# Patient Record
Sex: MALE | Race: WHITE | NOT HISPANIC OR LATINO | Employment: STUDENT | URBAN - METROPOLITAN AREA
[De-identification: names, ages, dates, MRNs, and addresses within clinical notes are randomized per-mention and may not be internally consistent; named-entity substitution may affect disease eponyms.]

---
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
## 2024-01-17 ENCOUNTER — LAB (OUTPATIENT)
Dept: LAB | Facility: HOSPITAL | Age: 19
End: 2024-01-17
Payer: OTHER GOVERNMENT

## 2024-01-17 ENCOUNTER — OFFICE VISIT (OUTPATIENT)
Dept: PSYCHIATRY | Facility: CLINIC | Age: 19
End: 2024-01-17
Payer: OTHER GOVERNMENT

## 2024-01-17 VITALS
BODY MASS INDEX: 23.73 KG/M2 | HEIGHT: 69 IN | DIASTOLIC BLOOD PRESSURE: 70 MMHG | SYSTOLIC BLOOD PRESSURE: 131 MMHG | HEART RATE: 96 BPM | WEIGHT: 160.2 LBS

## 2024-01-17 DIAGNOSIS — F90.2 ATTENTION DEFICIT HYPERACTIVITY DISORDER (ADHD), COMBINED TYPE: ICD-10-CM

## 2024-01-17 DIAGNOSIS — F84.0 AUTISM DISORDER: Primary | ICD-10-CM

## 2024-01-17 LAB
AMPHET+METHAMPHET UR QL: NEGATIVE
BARBITURATES UR QL SCN: NEGATIVE
BENZODIAZ UR QL SCN: NEGATIVE
CANNABINOIDS SERPL QL: NEGATIVE
COCAINE UR QL: NEGATIVE
FENTANYL UR-MCNC: NEGATIVE NG/ML
METHADONE UR QL SCN: NEGATIVE
OPIATES UR QL: NEGATIVE
OXYCODONE UR QL SCN: NEGATIVE

## 2024-01-17 PROCEDURE — 80307 DRUG TEST PRSMV CHEM ANLYZR: CPT

## 2024-01-17 NOTE — PROGRESS NOTES
"[facility]  Initial Evaluation    Referring Provider:  Dagoberto Robertson, APRN  [address]  [address]  [place] [address]    Chief Complaint: \"I think I have ADHD\"     History of Present Illness: Aneudy Gallegos is a whip-smart 18 y.o. male who presents today for initial evaluation regarding his desire to be screened for ADHD.  Mr. Gallegos presents unaccompanied in no acute distress and engages with me appropriately, he has strategically situated himself behind my double computer screens to avoid eye contact.  Psychotropic regimen with which patient presents is described as nothing.     History is positive for signs/symptoms suggestive of ADHD, and ASD. With regard to ADHD: I am presumptively treating patient for this issue; history as provided today, presentation today, his co-morbid diagnosis of Autism Spectrum Disorder would suggest a distinct possibility this is a contributing factor to patient's dysfunction in  roles and engagements irrespective to screening results. Patient has learned and successfully implemented compensatory coping skills that, with the Orthodoxy of layered stressors throughout adulthood, have begun to fail. Treating ADHD symptoms will likely be a concise and appropriate route to address anxiety and mood issues that are, at least to some degree, secondary to deficits related to traits of ADHD. Patient given options of slower-onset medications with low risk, vs. Stimulant-amphetamine based medications that can cause addiction, and tolerance, and currently on national shortage.   Psychiatric screening is negative for pathognomonic history of: TBI, depression, anxiety, PTSD, madison, psychosis, violence, and suicidality.     I have counseled the patient with regard to diagnoses and the recommended treatment regimen as documented below: I will assume prescriptive responsibility for anticipated ADHD agent. Patient acknowledges the diagnoses per my rendered " interpretation. Patient demonstrates awareness/understanding of viable alternatives for treatment as well as potential risks, benefits, and side effects associated with this regimen and is amenable to proceed in this fashion.     Recommended lifestyle changes: increase socializing, brisk 30 minute walks 3 days a week, consider phone application to maximize executive functioning.     Psychiatric History:  Diagnoses: Autism Spectrum Disorder  Outpatient history: in the home as child with ASD  Inpatient history: none  Medication trials: none  Other treatment modalities: none  Presenting regimen:none  Self harm: none  Suicide attempts: none    Substance Abuse History:   Types/methods/frequency: none  Withdrawal history: none  Sobriety: none  Transtheoretical stage: none    Social History:  Residence: lives house with mom, boyfriend, and nephew  Vocation: car place at [employer]  Education: 12 th grade, some trade school  Pertinent developmental history: ASD  Pertinent legal history: none  Hobbies/interests: radha,   Caodaism: none  Exercise: none  Dietary habits: no pertinent issues  Sleep hygiene: stays up late  Social habits: meeting people at new job  Sunlight: underexposed  Caffeine intake: varies  Hydration habits: not enough   history: family hx    Social History     Socioeconomic History    Marital status: Single   Tobacco Use    Smoking status: Never    Smokeless tobacco: Never   Vaping Use    Vaping Use: Never used   Substance and Sexual Activity    Alcohol use: Never    Drug use: Never    Sexual activity: Defer     Access to Firearms: none    Tobacco use counseling/intervention: N/A, patient does not use tobacco  PHQ-9 Depression Screening  PHQ-9 Total Score: 9    Little interest or pleasure in doing things? 1-->several days   Feeling down, depressed, or hopeless? 1-->several days   Trouble falling or staying asleep, or sleeping too much? 3-->nearly every day   Feeling tired or having little energy?  1-->several days   Poor appetite or overeating? 0-->not at all   Feeling bad about yourself - or that you are a failure or have let yourself or your family down? 1-->several days   Trouble concentrating on things, such as reading the newspaper or watching television? 2-->more than half the days   Moving or speaking so slowly that other people could have noticed? Or the opposite - being so fidgety or restless that you have been moving around a lot more than usual? 0-->not at all   Thoughts that you would be better off dead, or of hurting yourself in some way? 0-->not at all   PHQ-9 Total Score 9     SARAH-7  Feeling nervous, anxious or on edge: More than half the days  Not being able to stop or control worrying: Several days  Worrying too much about different things: More than half the days  Trouble Relaxing: Several days  Being so restless that it is hard to sit still: Several days  Feeling afraid as if something awful might happen: More than half the days  Becoming easily annoyed or irritable: Not at all  SARAH 7 Total Score: 9  If you checked any problems, how difficult have these problems made it for you to do your work, take care of things at home, or get along with other people: Somewhat difficult    Problem List:  Patient Active Problem List   Diagnosis    Autism disorder    Attention deficit hyperactivity disorder (ADHD), combined type     Allergy:   No Known Allergies     Discontinued Medications:  There are no discontinued medications.    Current Medications:   Current Outpatient Medications   Medication Sig Dispense Refill    diphenhydrAMINE (BENADRYL) 25 mg capsule Take 1 capsule by mouth Every 6 (Six) Hours As Needed for Itching.       No current facility-administered medications for this visit.     Past Medical History:  Past Medical History:   Diagnosis Date    Allergic      Past Surgical History:  No past surgical history on file.  Family History:   Family History   Problem Relation Age of Onset    Cancer  "Father      negative for dementia, seizures, bipolar disorder, and substance dependence unless otherwise noted    Mental Status Exam:   Appearance: well-groomed, sits upright, age-appropriate, normal habitus  Behavior: calm, cooperative, appropriate in demeanor, appropriate eye-contact for history   Mood/affect: euthymic / mood-congruent and appropriate in both range and amplitude  Speech: within expected variance; appropriate rate, appropriate rhythm, appropriate tone; non-pressured  Thought Process: linear, goal-directed; no FOI or SHELBIE; abstraction intact  Thought Content: coherent, devoid of overt delusions/perceptual disturbances  SI/HI: denies both SI and HI; exhibits future-orientation, self-advocates appropriately, no regular self-harm, no appreciable intent  Memory: no overt deficits  Orientation: oriented to person/place/time/situation  Concentration: appropriate during interview  Intellectual capacity: presumptively average  Insight: fair by given history/exam  Judgment: appropriate by given history/exam  Psychomotor: no appreciable latency/retardation/agitation/tremor  Gait: WNL    Review of Systems:  Review of Systems     Vital Signs:   /70   Pulse 96   Ht 174.6 cm (68.74\")   Wt 72.7 kg (160 lb 3.2 oz)   BMI 23.84 kg/m²    Lab Results:   Office Visit on 12/20/2023   Component Date Value Ref Range Status    Glucose 12/20/2023 90  65 - 99 mg/dL Final    BUN 12/20/2023 8  6 - 20 mg/dL Final    Creatinine 12/20/2023 0.87  0.76 - 1.27 mg/dL Final    Sodium 12/20/2023 139  136 - 145 mmol/L Final    Potassium 12/20/2023 4.4  3.5 - 5.2 mmol/L Final    Chloride 12/20/2023 102  98 - 107 mmol/L Final    CO2 12/20/2023 26.1  22.0 - 29.0 mmol/L Final    Calcium 12/20/2023 10.1  8.6 - 10.5 mg/dL Final    Total Protein 12/20/2023 8.1  6.0 - 8.5 g/dL Final    Albumin 12/20/2023 4.8  3.5 - 5.2 g/dL Final    ALT (SGPT) 12/20/2023 56 (H)  1 - 41 U/L Final    AST (SGOT) 12/20/2023 45 (H)  1 - 40 U/L Final    " Alkaline Phosphatase 12/20/2023 98  39 - 117 U/L Final    Total Bilirubin 12/20/2023 0.5  0.0 - 1.2 mg/dL Final    Globulin 12/20/2023 3.3  gm/dL Final    A/G Ratio 12/20/2023 1.5  g/dL Final    BUN/Creatinine Ratio 12/20/2023 9.2  7.0 - 25.0 Final    Anion Gap 12/20/2023 10.9  5.0 - 15.0 mmol/L Final    eGFR 12/20/2023 127.5  >60.0 mL/min/1.73 Final    TSH 12/20/2023 2.890  0.270 - 4.200 uIU/mL Final    Total Cholesterol 12/20/2023 194  0 - 200 mg/dL Final    Triglycerides 12/20/2023 130  0 - 150 mg/dL Final    HDL Cholesterol 12/20/2023 53  40 - 60 mg/dL Final    LDL Cholesterol  12/20/2023 118 (H)  0 - 100 mg/dL Final    VLDL Cholesterol 12/20/2023 23  5 - 40 mg/dL Final    LDL/HDL Ratio 12/20/2023 2.17   Final    WBC 12/20/2023 7.24  3.40 - 10.80 10*3/mm3 Final    RBC 12/20/2023 5.83 (H)  4.14 - 5.80 10*6/mm3 Final    Hemoglobin 12/20/2023 16.1  13.0 - 17.7 g/dL Final    Hematocrit 12/20/2023 49.3  37.5 - 51.0 % Final    MCV 12/20/2023 84.6  79.0 - 97.0 fL Final    MCH 12/20/2023 27.6  26.6 - 33.0 pg Final    MCHC 12/20/2023 32.7  31.5 - 35.7 g/dL Final    RDW 12/20/2023 13.1  12.3 - 15.4 % Final    RDW-SD 12/20/2023 40.0  37.0 - 54.0 fl Final    MPV 12/20/2023 11.3  6.0 - 12.0 fL Final    Platelets 12/20/2023 301  140 - 450 10*3/mm3 Final    Neutrophil % 12/20/2023 44.9  42.7 - 76.0 % Final    Lymphocyte % 12/20/2023 36.0  19.6 - 45.3 % Final    Monocyte % 12/20/2023 6.4  5.0 - 12.0 % Final    Eosinophil % 12/20/2023 11.3 (H)  0.3 - 6.2 % Final    Basophil % 12/20/2023 1.0  0.0 - 1.5 % Final    Immature Grans % 12/20/2023 0.4  0.0 - 0.5 % Final    Neutrophils, Absolute 12/20/2023 3.25  1.70 - 7.00 10*3/mm3 Final    Lymphocytes, Absolute 12/20/2023 2.61  0.70 - 3.10 10*3/mm3 Final    Monocytes, Absolute 12/20/2023 0.46  0.10 - 0.90 10*3/mm3 Final    Eosinophils, Absolute 12/20/2023 0.82 (H)  0.00 - 0.40 10*3/mm3 Final    Basophils, Absolute 12/20/2023 0.07  0.00 - 0.20 10*3/mm3 Final    Immature Grans, Absolute  12/20/2023 0.03  0.00 - 0.05 10*3/mm3 Final    nRBC 12/20/2023 0.0  0.0 - 0.2 /100 WBC Final     EKG Results:  No orders to display     Imaging Results:  No Images in the past 120 days found..  ASSESSMENT AND PLAN:    ICD-10-CM ICD-9-CM   1. Autism disorder  F84.0 299.00   2. Attention deficit hyperactivity disorder (ADHD), combined type  F90.2 314.01       18 y.o. male who presents today for initial evaluation regarding ADHD medication and screening.. We have discussed the history and interpreted diagnoses as above as well as the treatment plan below, including potential R/B/SE of the recommended regimen of which the patient demonstrates understanding. Patient is agreeable to call 911 or go to the nearest ER should he become concerned for he own safety and/or the safety of those around him. There are no overt indices of acute madison/psychosis on evaluation today.     Medication regimen: anticipated ADHD medication after UDS results, patient is advised not to misuse prescribed medications or to use any exogenous substances that aren't disclosed to this provider as they may interact with the regimen to his detriment.   Risk Assessment: protracted risk is low, imminent risk is low.  Risk factors include: recent/ongoing psychosocial stressors. Protective factors include: no known family history of suicidality, intact reality testing, no substance use disorder, no access to firearms, no present SI, no stated history of suicide attempts or self-harm, patient's exhibited future-orientation, strong social support, and patient's cooperation with care. Do note that this is subject to change with the Druze of new stressors, treatment non-adherence, use of substances, and/or new medical ails.  Monitoring: reviewed labs/imaging as populated above, UDS ordered; PHQ-9 today is 9/27, SARAH-7 today is 9/21  Therapy: defer  Follow-up: 2 weeks  Communications: N/A    TREATMENT PLAN/GOALS: challenge patterns of living conducive to  symptom burden, implement recommended regimen as above with augmentative, intermittent supportive psychotherapy to reduce symptom burden. Patient acknowledged and verbally consented to begin treatment as above. The importance of adherence to the recommended treatment and interval follow-up appointments was emphasized today. Patient was today advised to limit daily caffeine intake, hydrate appropriately, eat healthy and nutritious foods, engage sleep hygiene measures, engage appropriate exposure to sunlight, engage with hobbies in balance with life necessities, and exercise appropriate to their capacity to do so.     Billing: I have seen the patient today and considered his psychiatric complaints, rendered a diagnosis, and discussed treatment with the patient as above with which he consents.    Parts of this note are electronic transcriptions/translations of spoken language to printed text using the Dragon Dictation system.    Electronically signed by LAURA Duenas, 01/17/24,

## 2024-01-18 DIAGNOSIS — F90.2 ATTENTION DEFICIT HYPERACTIVITY DISORDER (ADHD), COMBINED TYPE: Primary | ICD-10-CM

## 2024-01-18 RX ORDER — DEXTROAMPHETAMINE SACCHARATE, AMPHETAMINE ASPARTATE MONOHYDRATE, DEXTROAMPHETAMINE SULFATE AND AMPHETAMINE SULFATE 1.25; 1.25; 1.25; 1.25 MG/1; MG/1; MG/1; MG/1
5 CAPSULE, EXTENDED RELEASE ORAL EVERY MORNING
Qty: 30 CAPSULE | Refills: 0 | Status: SHIPPED | OUTPATIENT
Start: 2024-01-18 | End: 2024-02-17

## 2024-01-31 ENCOUNTER — OFFICE VISIT (OUTPATIENT)
Dept: PSYCHIATRY | Facility: CLINIC | Age: 19
End: 2024-01-31
Payer: OTHER GOVERNMENT

## 2024-01-31 VITALS
DIASTOLIC BLOOD PRESSURE: 75 MMHG | BODY MASS INDEX: 25.34 KG/M2 | SYSTOLIC BLOOD PRESSURE: 128 MMHG | HEIGHT: 68 IN | HEART RATE: 86 BPM | WEIGHT: 167.2 LBS

## 2024-01-31 DIAGNOSIS — F90.2 ATTENTION DEFICIT HYPERACTIVITY DISORDER (ADHD), COMBINED TYPE: Primary | ICD-10-CM

## 2024-01-31 DIAGNOSIS — F84.0 AUTISM DISORDER: ICD-10-CM

## 2024-01-31 NOTE — PROGRESS NOTES
"[facility]  Follow-up Visit    Chief Complaint: \"\"I think I have ADHD\"      History of Present Illness: Aneudy Gallegos is a 18 y.o. male who presents today for follow-up regarding referral for ADHD neuropsychological testing. Last seen: 1/17/2024 at which time he was screened for ADHD, presumptively diagnosed for ADHD.  Mr. Gallegos presents unaccompanied in no acute distress and engages with me appropriately. Psychotropic regimen perceived to be Adderall XR 5 mg by mouth daily in a.m. Side-effects per given history: Patient reports that he has not started medication yet due to delay in pharmacy notification.    Current treatment regimen includes:   Amphetamine/dextroamphetamine XR 5 mg by mouth every morning for ADHD    Today the patient feels okay. Thought process and content are devoid of overt aberration suggestive of acute madison/psychosis. The patient denies SI/HI/AVH. There are no overt changes on exam today compared to most recent evaluation.  - contextual changes: No changes  - sleep: No changes  - appetite: No changes    I have counseled the patient with regard to diagnoses and the recommended treatment regimen as documented below. Patient acknowledges the diagnoses per my rendered interpretation. Patient demonstrates understanding of potential risks/benefits/side effects associated with this regimen and is amenable to proceed in this fashion.     Assignment: Talk to parents about  coverage and neuropsychological testing for official ADHD diagnosis.  Psychiatric History:  - Pertinent interval changes: No changes  - Psychotropic medication trials: No medication  - Key synopsis: Amphetamine dextroamphetamine XR 5 mg p.o. daily in a.m.    Substance Abuse History:   - Pertinent interval changes: No history  - Key synopsis: No history    Social History:  - Pertinent interval changes: No changes  - Key synopsis: No changes    Social History     Socioeconomic History "    Marital status: Single   Tobacco Use    Smoking status: Never    Smokeless tobacco: Never   Vaping Use    Vaping Use: Never used   Substance and Sexual Activity    Alcohol use: Never    Drug use: Never    Sexual activity: Defer       Tobacco use counseling/intervention: N/A, patient does not use tobacco    Problem List:  Patient Active Problem List   Diagnosis    Autism disorder    Attention deficit hyperactivity disorder (ADHD), combined type     Allergy:   No Known Allergies     Discontinued Medications:  There are no discontinued medications.    Current Medications:   Current Outpatient Medications   Medication Sig Dispense Refill    amphetamine-dextroamphetamine XR (Adderall XR) 5 MG 24 hr capsule Take 1 capsule by mouth Every Morning for 30 days 30 capsule 0    diphenhydrAMINE (BENADRYL) 25 mg capsule Take 1 capsule by mouth Every 6 (Six) Hours As Needed for Itching. (Patient not taking: Reported on 1/31/2024)       No current facility-administered medications for this visit.     Past Medical History:  Past Medical History:   Diagnosis Date    Allergic      Past Surgical History:  No past surgical history on file.    Mental Status Exam:   Appearance: well-groomed, sits upright, age-appropriate, normal habitus  Behavior: calm, cooperative, appropriate in demeanor, appropriate eye-contact  Mood/affect: euthymic / mood-congruent and appropriate in both range and amplitude  Speech: within expected variance; appropriate rate, appropriate rhythm, appropriate tone; non-pressured  Thought Process: linear, goal-directed; no FOI or SHELBIE; abstraction intact  Thought Content: coherent, devoid of overt delusions/perceptual disturbances  SI/HI: denies both SI and HI; exhibits future-orientation, self-advocates appropriately, no regular self-harm, no appreciable intent  Memory: no overt deficits  Orientation: oriented to person/place/time/situation  Concentration: appropriate during interview  Intellectual capacity:  "presumptively average  Insight: fair by given history/exam  Judgment: appropriate by given history/exam  Psychomotor: no appreciable latency/retardation/agitation/tremor  Gait: WNL    Review of Systems:  Review of Systems   Constitutional:  Negative for activity change, appetite change and unexpected weight change.   HENT:  Negative for drooling.    Eyes:  Negative for visual disturbance.   Respiratory:  Negative for chest tightness and shortness of breath.    Cardiovascular:  Negative for chest pain and palpitations.   Gastrointestinal:  Negative for abdominal pain, diarrhea and nausea.   Endocrine: Negative for cold intolerance and heat intolerance.   Genitourinary:  Negative for difficulty urinating and frequency.   Musculoskeletal:  Negative for myalgias and neck stiffness.   Skin:  Negative for rash.   Neurological:  Negative for dizziness, tremors, seizures and light-headedness.     Vital Signs:   /75   Pulse 86   Ht 172.7 cm (68\")   Wt 75.8 kg (167 lb 3.2 oz)   BMI 25.42 kg/m²    Lab Results:   Lab on 01/17/2024   Component Date Value Ref Range Status    Amphet/Methamphet, Screen 01/17/2024 Negative  Negative Final    Barbiturates Screen, Urine 01/17/2024 Negative  Negative Final    Benzodiazepine Screen, Urine 01/17/2024 Negative  Negative Final    Cocaine Screen, Urine 01/17/2024 Negative  Negative Final    Opiate Screen 01/17/2024 Negative  Negative Final    THC, Screen, Urine 01/17/2024 Negative  Negative Final    Methadone Screen, Urine 01/17/2024 Negative  Negative Final    Oxycodone Screen, Urine 01/17/2024 Negative  Negative Final    Fentanyl, Urine 01/17/2024 Negative  Negative Final   Office Visit on 12/20/2023   Component Date Value Ref Range Status    Glucose 12/20/2023 90  65 - 99 mg/dL Final    BUN 12/20/2023 8  6 - 20 mg/dL Final    Creatinine 12/20/2023 0.87  0.76 - 1.27 mg/dL Final    Sodium 12/20/2023 139  136 - 145 mmol/L Final    Potassium 12/20/2023 4.4  3.5 - 5.2 mmol/L Final "    Chloride 12/20/2023 102  98 - 107 mmol/L Final    CO2 12/20/2023 26.1  22.0 - 29.0 mmol/L Final    Calcium 12/20/2023 10.1  8.6 - 10.5 mg/dL Final    Total Protein 12/20/2023 8.1  6.0 - 8.5 g/dL Final    Albumin 12/20/2023 4.8  3.5 - 5.2 g/dL Final    ALT (SGPT) 12/20/2023 56 (H)  1 - 41 U/L Final    AST (SGOT) 12/20/2023 45 (H)  1 - 40 U/L Final    Alkaline Phosphatase 12/20/2023 98  39 - 117 U/L Final    Total Bilirubin 12/20/2023 0.5  0.0 - 1.2 mg/dL Final    Globulin 12/20/2023 3.3  gm/dL Final    A/G Ratio 12/20/2023 1.5  g/dL Final    BUN/Creatinine Ratio 12/20/2023 9.2  7.0 - 25.0 Final    Anion Gap 12/20/2023 10.9  5.0 - 15.0 mmol/L Final    eGFR 12/20/2023 127.5  >60.0 mL/min/1.73 Final    TSH 12/20/2023 2.890  0.270 - 4.200 uIU/mL Final    Total Cholesterol 12/20/2023 194  0 - 200 mg/dL Final    Triglycerides 12/20/2023 130  0 - 150 mg/dL Final    HDL Cholesterol 12/20/2023 53  40 - 60 mg/dL Final    LDL Cholesterol  12/20/2023 118 (H)  0 - 100 mg/dL Final    VLDL Cholesterol 12/20/2023 23  5 - 40 mg/dL Final    LDL/HDL Ratio 12/20/2023 2.17   Final    WBC 12/20/2023 7.24  3.40 - 10.80 10*3/mm3 Final    RBC 12/20/2023 5.83 (H)  4.14 - 5.80 10*6/mm3 Final    Hemoglobin 12/20/2023 16.1  13.0 - 17.7 g/dL Final    Hematocrit 12/20/2023 49.3  37.5 - 51.0 % Final    MCV 12/20/2023 84.6  79.0 - 97.0 fL Final    MCH 12/20/2023 27.6  26.6 - 33.0 pg Final    MCHC 12/20/2023 32.7  31.5 - 35.7 g/dL Final    RDW 12/20/2023 13.1  12.3 - 15.4 % Final    RDW-SD 12/20/2023 40.0  37.0 - 54.0 fl Final    MPV 12/20/2023 11.3  6.0 - 12.0 fL Final    Platelets 12/20/2023 301  140 - 450 10*3/mm3 Final    Neutrophil % 12/20/2023 44.9  42.7 - 76.0 % Final    Lymphocyte % 12/20/2023 36.0  19.6 - 45.3 % Final    Monocyte % 12/20/2023 6.4  5.0 - 12.0 % Final    Eosinophil % 12/20/2023 11.3 (H)  0.3 - 6.2 % Final    Basophil % 12/20/2023 1.0  0.0 - 1.5 % Final    Immature Grans % 12/20/2023 0.4  0.0 - 0.5 % Final    Neutrophils,  Absolute 12/20/2023 3.25  1.70 - 7.00 10*3/mm3 Final    Lymphocytes, Absolute 12/20/2023 2.61  0.70 - 3.10 10*3/mm3 Final    Monocytes, Absolute 12/20/2023 0.46  0.10 - 0.90 10*3/mm3 Final    Eosinophils, Absolute 12/20/2023 0.82 (H)  0.00 - 0.40 10*3/mm3 Final    Basophils, Absolute 12/20/2023 0.07  0.00 - 0.20 10*3/mm3 Final    Immature Grans, Absolute 12/20/2023 0.03  0.00 - 0.05 10*3/mm3 Final    nRBC 12/20/2023 0.0  0.0 - 0.2 /100 WBC Final     EKG Results:  No orders to display     Imaging Results:  No Images in the past 120 days found..  ASSESSMENT AND PLAN:    ICD-10-CM ICD-9-CM   1. Attention deficit hyperactivity disorder (ADHD), combined type  F90.2 314.01   2. Autism disorder  F84.0 299.00       18 y.o. male who presents today for follow-up regarding medication management and referral for neuropsychological testing. We have discussed the interval history and the treatment plan below, including potential R/B/SE of the recommended regimen of which the patient demonstrates understanding. Patient is agreeable to call 911 or go to the nearest ER should he become concerned for his own safety and/or the safety of those around him. There are no overt indices of acute madison/psychosis on exam today. JOBY reviewed and isas expected.    Medication regimen: Amphetamine/dextroamphetamine XR 5 mg p.o. daily a.m.; patient is advised not to misuse prescribed medications or to use them with any exogenous substances that aren't disclosed to this provider as they may interact with the regimen to the patient's detriment.   Risk Assessment: protracted risk is low, imminent risk is low-no interval change. Do note that this is subject to change with the Mu-ism of new stressors, treatment non-adherence, use of substances, and/or new medical ails.   Monitoring: reviewed labs/imaging as populated above;HORACIO reviewed  Therapy:[facility]    Follow-up: 2 weeks  Communications: N/A    TREATMENT  PLAN/GOALS: challenge patterns of living conducive to symptom burden, implement recommended regimen as above with augmentative, intermittent supportive psychotherapy to reduce symptom burden. Patient acknowledged and verbally consented to continue treatment. The importance of adherence to the recommended treatment and interval follow-up appointments was again emphasized today: patient has fair treatment adherence per given history. Patient was today reminded to limit daily caffeine intake, hydrate appropriately, eat healthy and nutritious foods, engage sleep hygiene measures, engage appropriate exposure to sunlight, engage with hobbies in balance with life necessities, and exercise appropriate to their capacity to do so.     Parts of this note are electronic transcriptions/translations of spoken language to printed text using the Dragon Dictation system.    Electronically signed by LAURA Duenas, 01/31/24,

## 2024-02-14 ENCOUNTER — OFFICE VISIT (OUTPATIENT)
Dept: PSYCHIATRY | Facility: CLINIC | Age: 19
End: 2024-02-14
Payer: OTHER GOVERNMENT

## 2024-02-14 VITALS
SYSTOLIC BLOOD PRESSURE: 124 MMHG | WEIGHT: 169.2 LBS | HEART RATE: 80 BPM | HEIGHT: 68 IN | BODY MASS INDEX: 25.64 KG/M2 | DIASTOLIC BLOOD PRESSURE: 81 MMHG

## 2024-02-14 DIAGNOSIS — F84.0 AUTISM DISORDER: ICD-10-CM

## 2024-02-14 DIAGNOSIS — F90.2 ATTENTION DEFICIT HYPERACTIVITY DISORDER (ADHD), COMBINED TYPE: Primary | ICD-10-CM

## 2024-03-21 ENCOUNTER — TELEPHONE (OUTPATIENT)
Dept: PSYCHIATRY | Facility: CLINIC | Age: 19
End: 2024-03-21
Payer: OTHER GOVERNMENT

## 2024-03-21 DIAGNOSIS — F90.2 ATTENTION DEFICIT HYPERACTIVITY DISORDER (ADHD), COMBINED TYPE: Primary | ICD-10-CM

## 2024-03-21 NOTE — TELEPHONE ENCOUNTER
PT(PATIENT) VERIFIED     PT(PATIENT) STATES HE WAS SUPPOSED TO START ADDEREAL 10MG     PER MOST RECENT OFFICE NOTE   Office Visit with Flower Robertson APRN (2024)   Medications:   TBD 2024 patient is due refill of Adderall XR we will increase to 10 mg daily.    PT(PATIENT) COMPLETED UDS(URINE DRUG SCREEN)   Urine Drug Screen - Urine, Clean Catch (2024 16:51)     NEXT APPT WITH PROVIDER   Appointment with Flower Robertson APRN (2024)     PROVIDER PLEASE ADVISE

## 2024-03-22 RX ORDER — DEXTROAMPHETAMINE SACCHARATE, AMPHETAMINE ASPARTATE MONOHYDRATE, DEXTROAMPHETAMINE SULFATE AND AMPHETAMINE SULFATE 2.5; 2.5; 2.5; 2.5 MG/1; MG/1; MG/1; MG/1
10 CAPSULE, EXTENDED RELEASE ORAL DAILY
Qty: 30 CAPSULE | Refills: 0 | Status: SHIPPED | OUTPATIENT
Start: 2024-03-22 | End: 2024-04-21

## 2024-04-16 NOTE — PROGRESS NOTES
"[facility]  Follow-up Visit    Chief Complaint:  \"I think I have ADHD\"      Initial History: Aneudy Gallegos is a whip-smart 18 y.o. male who presents today for initial evaluation regarding his desire to be screened for ADHD.  Mr. Gallegos presents unaccompanied in no acute distress and engages with me appropriately, he has strategically situated himself behind my double computer screens to avoid eye contact.  Psychotropic regimen with which patient presents is described as nothing.      History is positive for signs/symptoms suggestive of ADHD, and ASD. With regard to ADHD: I am presumptively treating patient for this issue; history as provided today, presentation today, his co-morbid diagnosis of Autism Spectrum Disorder would suggest a distinct possibility this is a contributing factor to patient's dysfunction in  roles and engagements irrespective to screening results. Patient has learned and successfully implemented compensatory coping skills that, with the Bahai of layered stressors throughout adulthood, have begun to fail. Treating ADHD symptoms will likely be a concise and appropriate route to address anxiety and mood issues that are, at least to some degree, secondary to deficits related to traits of ADHD. Patient given options of slower-onset medications with low risk, vs. Stimulant-amphetamine based medications that can cause addiction, and tolerance, and currently on national shortage.   Psychiatric screening is negative for pathognomonic history of: TBI, depression, anxiety, PTSD, madison, psychosis, violence, and suicidality.      I have counseled the patient with regard to diagnoses and the recommended treatment regimen as documented below: I will assume prescriptive responsibility for anticipated ADHD agent. Patient acknowledges the diagnoses per my rendered interpretation. Patient demonstrates awareness/understanding of viable alternatives for treatment as well " as potential risks, benefits, and side effects associated with this regimen and is amenable to proceed in this fashion.      Recommended lifestyle changes: increase socializing, brisk 30 minute walks 3 days a week, consider phone application to maximize executive functioning.      Psychiatric History:  Diagnoses: Autism Spectrum Disorder  Outpatient history: in the home as child with ASD  Inpatient history: none  Medication trials: none  Other treatment modalities: none  Presenting regimen:none  Self harm: none  Suicide attempts: none     Substance Abuse History:   Types/methods/frequency: none  Withdrawal history: none  Sobriety: none  Transtheoretical stage: none     Social History:  Residence: lives house with mom, boyfriend, and nephew  Vocation: car place at [employer]  Education: 12 th grade, some trade school  Pertinent developmental history: ASD  Pertinent legal history: none  Hobbies/interests: radha,   Scientologist: none  Exercise: none  Dietary habits: no pertinent issues  Sleep hygiene: stays up late  Social habits: meeting people at new job  Sunlight: underexposed  Caffeine intake: varies  Hydration habits: not enough   history: family hx            Interval History Aneudy is a 18 y.o. male who presents today for follow-up regarding medication management. Aneudy presents unaccompanied in no acute distress and engages with me appropriately.     Current treatment regimen includes:   -Amphetamine/dextroamphetamine XR 10 mg p.o. every morning  Side-effects per given history: Patient reports this dose works great.      Today the patient feels frustrated.  Mr. Gallegos expressed some concerns over me receiving phone call messages from our MA clinical pool staff, I reviewed the telephone messages that I had received.  We reviewed protocols for our office about the National stimulant shortage.   Mr. Gallegos endorses that he will be receiving his prescriptions at the [facility] in [place].   This change has been noted in his chart.    Patient desires to be tested for ADHD for formal diagnosis and information was given on an [place] practitioner named German Claros PsyD.    When asked if patient was willing to travel to received testing patient said he was unclear.  I encouraged him to reach out to the [facility] psychology department, or [facility] psychology department because these departments may offer discounted testing as part of her student practicum.   Thought process and content are devoid of overt aberration suggestive of acute madison/psychosis. The patient denies SI/HI/AVH. There are not changes on exam today compared to most recent evaluation.    - sleep: no concerns  - appetite: well controlled    I have counseled the patient with regard to diagnoses and the recommended treatment regimen as documented below. Patient acknowledges the diagnoses per my rendered interpretation. Patient demonstrates understanding of potential risks/benefits/side effects associated with this regimen and is amenable to proceed in this fashion.     Assignment: begin journaling instances of overwhelm, response thereto, ideal response to cultivate insight and begin breaking a pattern of stimulus/response.      Social History     Socioeconomic History    Marital status: Single   Tobacco Use    Smoking status: Never    Smokeless tobacco: Never   Vaping Use    Vaping status: Never Used   Substance and Sexual Activity    Alcohol use: Never    Drug use: Never    Sexual activity: Defer       Tobacco use counseling/intervention: N/A.Problem List:  Patient Active Problem List   Diagnosis    Autism disorder    Attention deficit hyperactivity disorder (ADHD), combined type     Allergy:   No Known Allergies     Discontinued Medications:  Medications Discontinued During This Encounter   Medication Reason    diphenhydrAMINE (BENADRYL) 25 mg capsule *Therapy completed       Current Medications:   Current Outpatient  "Medications   Medication Sig Dispense Refill    amphetamine-dextroamphetamine XR (Adderall XR) 10 MG 24 hr capsule Take 1 capsule by mouth Daily for 30 days 30 capsule 0     No current facility-administered medications for this visit.     Past Medical History:  Past Medical History:   Diagnosis Date    Allergic      Past Surgical History:  No past surgical history on file.    MENTAL STATUS EXAM   General Appearance:  Cleanly groomed and dressed  Eye Contact:  Poor eye contact and other  Other Comment:  Baseline  Attitude:  Cooperative and guarded  Motor Activity:  Normal gait, posture and fidgety  Muscle Strength:  Normal  Speech:  Normal rate, tone, volume, monotone and other  Other Comment:  Baseline  Language:  Spontaneous  Mood and affect:  Appropriate  Thought Process:  Logical and goal-directed  Associations/ Thought Content:  No delusions  Hallucinations:  None  Suicidal Ideations:  Not present  Homicidal Ideation:  Not present  Sensorium:  Alert  Orientation:  Person  Immediate Recall, Recent, and Remote Memory:  Intact  Attention Span/ Concentration:  Good  Fund of Knowledge:  Appropriate for age and educational level  Intellectual Functioning:  Average range  Insight:  Good  Judgement:  Good  Reliability:  Good  Impulse Control:  Good      Vital Signs:   /68   Pulse 79   Ht 172.7 cm (68\")   Wt 77.1 kg (170 lb)   BMI 25.85 kg/m²    Lab Results:   Lab on 01/17/2024   Component Date Value Ref Range Status    Amphet/Methamphet, Screen 01/17/2024 Negative  Negative Final    Barbiturates Screen, Urine 01/17/2024 Negative  Negative Final    Benzodiazepine Screen, Urine 01/17/2024 Negative  Negative Final    Cocaine Screen, Urine 01/17/2024 Negative  Negative Final    Opiate Screen 01/17/2024 Negative  Negative Final    THC, Screen, Urine 01/17/2024 Negative  Negative Final    Methadone Screen, Urine 01/17/2024 Negative  Negative Final    Oxycodone Screen, Urine 01/17/2024 Negative  Negative Final    " Fentanyl, Urine 01/17/2024 Negative  Negative Final   Office Visit on 12/20/2023   Component Date Value Ref Range Status    Glucose 12/20/2023 90  65 - 99 mg/dL Final    BUN 12/20/2023 8  6 - 20 mg/dL Final    Creatinine 12/20/2023 0.87  0.76 - 1.27 mg/dL Final    Sodium 12/20/2023 139  136 - 145 mmol/L Final    Potassium 12/20/2023 4.4  3.5 - 5.2 mmol/L Final    Chloride 12/20/2023 102  98 - 107 mmol/L Final    CO2 12/20/2023 26.1  22.0 - 29.0 mmol/L Final    Calcium 12/20/2023 10.1  8.6 - 10.5 mg/dL Final    Total Protein 12/20/2023 8.1  6.0 - 8.5 g/dL Final    Albumin 12/20/2023 4.8  3.5 - 5.2 g/dL Final    ALT (SGPT) 12/20/2023 56 (H)  1 - 41 U/L Final    AST (SGOT) 12/20/2023 45 (H)  1 - 40 U/L Final    Alkaline Phosphatase 12/20/2023 98  39 - 117 U/L Final    Total Bilirubin 12/20/2023 0.5  0.0 - 1.2 mg/dL Final    Globulin 12/20/2023 3.3  gm/dL Final    A/G Ratio 12/20/2023 1.5  g/dL Final    BUN/Creatinine Ratio 12/20/2023 9.2  7.0 - 25.0 Final    Anion Gap 12/20/2023 10.9  5.0 - 15.0 mmol/L Final    eGFR 12/20/2023 127.5  >60.0 mL/min/1.73 Final    TSH 12/20/2023 2.890  0.270 - 4.200 uIU/mL Final    Total Cholesterol 12/20/2023 194  0 - 200 mg/dL Final    Triglycerides 12/20/2023 130  0 - 150 mg/dL Final    HDL Cholesterol 12/20/2023 53  40 - 60 mg/dL Final    LDL Cholesterol  12/20/2023 118 (H)  0 - 100 mg/dL Final    VLDL Cholesterol 12/20/2023 23  5 - 40 mg/dL Final    LDL/HDL Ratio 12/20/2023 2.17   Final    WBC 12/20/2023 7.24  3.40 - 10.80 10*3/mm3 Final    RBC 12/20/2023 5.83 (H)  4.14 - 5.80 10*6/mm3 Final    Hemoglobin 12/20/2023 16.1  13.0 - 17.7 g/dL Final    Hematocrit 12/20/2023 49.3  37.5 - 51.0 % Final    MCV 12/20/2023 84.6  79.0 - 97.0 fL Final    MCH 12/20/2023 27.6  26.6 - 33.0 pg Final    MCHC 12/20/2023 32.7  31.5 - 35.7 g/dL Final    RDW 12/20/2023 13.1  12.3 - 15.4 % Final    RDW-SD 12/20/2023 40.0  37.0 - 54.0 fl Final    MPV 12/20/2023 11.3  6.0 - 12.0 fL Final    Platelets  12/20/2023 301  140 - 450 10*3/mm3 Final    Neutrophil % 12/20/2023 44.9  42.7 - 76.0 % Final    Lymphocyte % 12/20/2023 36.0  19.6 - 45.3 % Final    Monocyte % 12/20/2023 6.4  5.0 - 12.0 % Final    Eosinophil % 12/20/2023 11.3 (H)  0.3 - 6.2 % Final    Basophil % 12/20/2023 1.0  0.0 - 1.5 % Final    Immature Grans % 12/20/2023 0.4  0.0 - 0.5 % Final    Neutrophils, Absolute 12/20/2023 3.25  1.70 - 7.00 10*3/mm3 Final    Lymphocytes, Absolute 12/20/2023 2.61  0.70 - 3.10 10*3/mm3 Final    Monocytes, Absolute 12/20/2023 0.46  0.10 - 0.90 10*3/mm3 Final    Eosinophils, Absolute 12/20/2023 0.82 (H)  0.00 - 0.40 10*3/mm3 Final    Basophils, Absolute 12/20/2023 0.07  0.00 - 0.20 10*3/mm3 Final    Immature Grans, Absolute 12/20/2023 0.03  0.00 - 0.05 10*3/mm3 Final    nRBC 12/20/2023 0.0  0.0 - 0.2 /100 WBC Final       ASSESSMENT AND PLAN:    ICD-10-CM ICD-9-CM   1. Attention deficit hyperactivity disorder (ADHD), combined type  F90.2 314.01   2. Autism disorder  F84.0 299.00       Aneudy is a 18 y.o. male who presents today for follow-up regarding medication management. We have discussed the interval history and the treatment plan below, including potential R/B/SE of the recommended regimen of which the patient demonstrates understanding. Patient is agreeable to call 911 or go to the nearest ER should he become concerned for his own safety and/or the safety of those around him. There are are no overt indices of acute madison/psychosis on exam today. JOBY reviewed and is as expected.    Medication regimen: Continue amphetamine/dextroamphetamine XR 10 mg p.o. every morning; patient is advised not to misuse prescribed medications or to use them with any exogenous substances that aren't disclosed to this provider as they may interact with the regimen to the patient's detriment.   Risk Assessment: protracted risk is moderate, imminent risk is low/moderate do note that this is subject to change with the Moravian of new  stressors, treatment non-adherence, use of substances, and/or new medical ails.   Monitoring: reviewed labs/imaging as populated above; ordered  Therapy: Information given for German Claros PsyD  Follow-up: two months  Communications: N/A    TREATMENT PLAN/GOALS: challenge patterns of living conducive to symptom burden, implement recommended regimen as above with augmentative, intermittent supportive psychotherapy to reduce symptom burden. Patient acknowledged and verbally consented to continue treatment. The importance of adherence to the recommended treatment and interval follow-up appointments was again emphasized today: patient has good treatment adherence per given history. Patient was today reminded to limit daily caffeine intake, hydrate appropriately, eat healthy and nutritious foods, engage sleep hygiene measures, engage appropriate exposure to sunlight, engage with hobbies in balance with life necessities, and exercise appropriate to their capacity to do so.         Parts of this note are electronic transcriptions/translations of spoken language to printed text using the Dragon Dictation system.    Electronically signed by LAURA Duenas, 04/16/24

## 2024-04-17 ENCOUNTER — OFFICE VISIT (OUTPATIENT)
Dept: PSYCHIATRY | Facility: CLINIC | Age: 19
End: 2024-04-17
Payer: OTHER GOVERNMENT

## 2024-04-17 VITALS
BODY MASS INDEX: 25.76 KG/M2 | HEIGHT: 68 IN | HEART RATE: 79 BPM | DIASTOLIC BLOOD PRESSURE: 68 MMHG | SYSTOLIC BLOOD PRESSURE: 117 MMHG | WEIGHT: 170 LBS

## 2024-04-17 DIAGNOSIS — F90.2 ATTENTION DEFICIT HYPERACTIVITY DISORDER (ADHD), COMBINED TYPE: Primary | ICD-10-CM

## 2024-04-17 DIAGNOSIS — F84.0 AUTISM DISORDER: ICD-10-CM

## 2024-05-13 ENCOUNTER — TELEPHONE (OUTPATIENT)
Dept: PSYCHIATRY | Facility: CLINIC | Age: 19
End: 2024-05-13
Payer: OTHER GOVERNMENT

## 2024-05-13 DIAGNOSIS — F90.2 ATTENTION DEFICIT HYPERACTIVITY DISORDER (ADHD), COMBINED TYPE: Primary | ICD-10-CM

## 2024-05-13 NOTE — TELEPHONE ENCOUNTER
PT(PATIENT) VERIFIED     amphetamine-dextroamphetamine XR (Adderall XR) 10 MG 24 hr capsule (2024)     PT(PATIENT) STATES HE NEEDED THE MEDICATION TO BE SENT TO [facility]     NEXT APPT WITH PROVIDER   Appointment with Flower Robertson APRN (2024)     PROVIDER PLEASE ADVISE

## 2024-05-13 NOTE — TELEPHONE ENCOUNTER
PT PHONED THE ON CALL SERVICE ON 05/11/2024 STATING THAT HIS MED'S WERE NOT AT THE CORRECT PHARMACY.     WILL NEED TO FOLLOW UP WITH PT AND RETURN CALL.

## 2024-05-14 RX ORDER — DEXTROAMPHETAMINE SACCHARATE, AMPHETAMINE ASPARTATE MONOHYDRATE, DEXTROAMPHETAMINE SULFATE AND AMPHETAMINE SULFATE 2.5; 2.5; 2.5; 2.5 MG/1; MG/1; MG/1; MG/1
10 CAPSULE, EXTENDED RELEASE ORAL DAILY
Qty: 30 CAPSULE | Refills: 0 | Status: SHIPPED | OUTPATIENT
Start: 2024-05-14 | End: 2025-05-14

## 2024-06-24 DIAGNOSIS — F90.2 ATTENTION DEFICIT HYPERACTIVITY DISORDER (ADHD), COMBINED TYPE: ICD-10-CM

## 2024-06-24 RX ORDER — DEXTROAMPHETAMINE SACCHARATE, AMPHETAMINE ASPARTATE MONOHYDRATE, DEXTROAMPHETAMINE SULFATE AND AMPHETAMINE SULFATE 2.5; 2.5; 2.5; 2.5 MG/1; MG/1; MG/1; MG/1
10 CAPSULE, EXTENDED RELEASE ORAL EVERY MORNING
Qty: 30 CAPSULE | Refills: 0 | Status: SHIPPED | OUTPATIENT
Start: 2024-06-24

## 2024-06-24 NOTE — TELEPHONE ENCOUNTER
REFILL REQUEST FROM THE PT VIA THE ON CALL SERVICE FOR ADDERALL XR 10 MG CAPSULES.    amphetamine-dextroamphetamine XR (Adderall XR) 10 MG 24 hr capsule (05/14/2024)     FOLLOW UP APPT ON 07/01/2024.  PT LAST SEEN ON 04/17/2024.    LAST UDS RESULTS:  Urine Drug Screen - Urine, Clean Catch (01/17/2024 16:51)     ROUTING TO THE COVERING PROVIDER.

## 2024-07-31 DIAGNOSIS — F90.2 ATTENTION DEFICIT HYPERACTIVITY DISORDER (ADHD), COMBINED TYPE: ICD-10-CM

## 2024-07-31 NOTE — TELEPHONE ENCOUNTER
PT PHONED THE ON CALL SERVICE.    PT REQUESTING REFILL ON ADDERALL XR 10 MG CAPSULES.    amphetamine-dextroamphetamine XR (Adderall XR) 10 MG 24 hr capsule (06/24/2024)     PT ALSO WANTING TO SCHEDULE FOLLOW UP APPT.    I CALLED PT TO SCHEDULE FOLLOW UP APPT TO PROCESS REFILL    PT DID NOT ANSWER AND VOICEMAIL BOX DID NOT .

## 2024-08-01 RX ORDER — DEXTROAMPHETAMINE SACCHARATE, AMPHETAMINE ASPARTATE MONOHYDRATE, DEXTROAMPHETAMINE SULFATE AND AMPHETAMINE SULFATE 2.5; 2.5; 2.5; 2.5 MG/1; MG/1; MG/1; MG/1
10 CAPSULE, EXTENDED RELEASE ORAL EVERY MORNING
Qty: 30 CAPSULE | Refills: 0 | OUTPATIENT
Start: 2024-08-01

## 2024-08-01 NOTE — TELEPHONE ENCOUNTER
I ATTEMPTED TO PHONE PT TO RELAY OF PROVIDERS MESSAGE.    PT DID NOT ANSWER AND VOICEMAIL BOX DID NOT .

## 2024-08-01 NOTE — TELEPHONE ENCOUNTER
2ND ATTEMPT MADE TO CONTACT PT VIA TELEPHONE.     I SPOKE TO PT.  PT IS SCHEDULED FOR IN PERSON FOLLOW UP ON 08/07/2024.    I EXPRESSED TO PT THAT IT WOULD BE PROVIDERS DISCRETION ON HIS REFILL AS HE HASN'T BEEN SEEN SINCE 04/17/2024. UNDERSTANDING WAS EXPRESSED.

## 2024-08-02 NOTE — TELEPHONE ENCOUNTER
PT(PATIENT) VERIFIED     CONTACTED PT(PATIENT) PER PROVIDER'S INSTRUCTIONS     PT(PATIENT) STATES HE WILL COMPLETE AT UPCOMING APPT     REMINDER ADDED TO UPCOMING APPT NOTES     PT(PATIENT) VERBALIZED UNDERSTANDING AND HAD NO FURTHER QUESTIONS AT THIS TIME

## 2024-08-07 ENCOUNTER — OFFICE VISIT (OUTPATIENT)
Dept: PSYCHIATRY | Facility: CLINIC | Age: 19
End: 2024-08-07
Payer: OTHER GOVERNMENT

## 2024-08-07 VITALS
BODY MASS INDEX: 24.49 KG/M2 | DIASTOLIC BLOOD PRESSURE: 72 MMHG | HEIGHT: 68 IN | SYSTOLIC BLOOD PRESSURE: 127 MMHG | WEIGHT: 161.6 LBS | HEART RATE: 79 BPM

## 2024-08-07 DIAGNOSIS — F90.2 ATTENTION DEFICIT HYPERACTIVITY DISORDER (ADHD), COMBINED TYPE: Primary | ICD-10-CM

## 2024-08-07 DIAGNOSIS — F84.0 AUTISM DISORDER: ICD-10-CM

## 2024-08-07 PROCEDURE — 99214 OFFICE O/P EST MOD 30 MIN: CPT

## 2024-08-07 NOTE — PROGRESS NOTES
"[facility]  Follow-up Visit    Chief Complaint:  \"I think I have ADHD\"      History of Present Illness: Aneudy Gallegos is a whip-smart 18 y.o. male who presents today for initial evaluation regarding his desire to be screened for ADHD.  Mr. Gallegos presents unaccompanied in no acute distress and engages with me appropriately, he has strategically situated himself behind my double computer screens to avoid eye contact.  Psychotropic regimen with which patient presents is described as nothing.      History is positive for signs/symptoms suggestive of ADHD, and ASD. With regard to ADHD: I am presumptively treating patient for this issue; history as provided today, presentation today, his co-morbid diagnosis of Autism Spectrum Disorder would suggest a distinct possibility this is a contributing factor to patient's dysfunction in  roles and engagements irrespective to screening results. Patient has learned and successfully implemented compensatory coping skills that, with the Holiness of layered stressors throughout adulthood, have begun to fail. Treating ADHD symptoms will likely be a concise and appropriate route to address anxiety and mood issues that are, at least to some degree, secondary to deficits related to traits of ADHD. Patient given options of slower-onset medications with low risk, vs. Stimulant-amphetamine based medications that can cause addiction, and tolerance, and currently on national shortage.   Psychiatric screening is negative for pathognomonic history of: TBI, depression, anxiety, PTSD, madison, psychosis, violence, and suicidality.      I have counseled the patient with regard to diagnoses and the recommended treatment regimen as documented below: I will assume prescriptive responsibility for anticipated ADHD agent. Patient acknowledges the diagnoses per my rendered interpretation. Patient demonstrates awareness/understanding of viable alternatives for " treatment as well as potential risks, benefits, and side effects associated with this regimen and is amenable to proceed in this fashion.      Recommended lifestyle changes: increase socializing, brisk 30 minute walks 3 days a week, consider phone application to maximize executive functioning.      Psychiatric History:  Diagnoses: Autism Spectrum Disorder  Outpatient history: in the home as child with ASD  Inpatient history: none  Medication trials: none  Other treatment modalities: none  Presenting regimen:none  Self harm: none  Suicide attempts: none     Substance Abuse History:   Types/methods/frequency: none  Withdrawal history: none  Sobriety: none  Transtheoretical stage: none     Social History:  Residence: lives house with mom, boyfriend, and nephew  Vocation: car place at [employer]  Education: 12 th grade, some trade school  Pertinent developmental history: ASD  Pertinent legal history: none  Hobbies/interests: radha,   Shinto: none  Exercise: none  Dietary habits: no pertinent issues  Sleep hygiene: stays up late  Social habits: meeting people at new job  Sunlight: underexposed  Caffeine intake: varies  Hydration habits: not enough   history: family hx            Interval History Abhijit is a 18 y.o. male who presents today for follow-up.  Abhijit presents unaccompanied in no acute distress and engages with me appropriately.     Current treatment regimen includes:   - Amphetamine-Dextroamphetamine XR 10 mg po q am  Side-effects per given history: wears off about 6 hours later.      Today the patient feels happy. Aneudy leaves for [employer] next week. Requests that we send his RX to somewhere in the area to campus.  Abhijit was a little disappointed that Dr. Claros would not return his phone call about confirmatory testing for ADHD and also he had no luck getting in with [employer] for testing, although he is on a waiting list. His thought process and content are devoid of overt aberration suggestive of acute  madison/psychosis. The patient denies SI/HI/AVH. There are not changes on exam today compared to most recent evaluation.    - sleep: no concerns  - appetite: moderately controlled    I have counseled the patient with regard to diagnoses and the recommended treatment regimen as documented below. Patient acknowledges the diagnoses per my rendered interpretation. Patient demonstrates understanding of potential risks/benefits/side effects associated with this regimen and is amenable to proceed in this fashion.     Assignment: begin journaling instances of overwhelm, response thereto, ideal response to cultivate insight and begin breaking a pattern of stimulus/response.      Social History     Socioeconomic History    Marital status: Single   Tobacco Use    Smoking status: Never    Smokeless tobacco: Never   Vaping Use    Vaping status: Never Used   Substance and Sexual Activity    Alcohol use: Never    Drug use: Never    Sexual activity: Defer       Tobacco use counseling/intervention: patient does not use tobacco.   Problem List:  Patient Active Problem List   Diagnosis    Autism disorder    Attention deficit hyperactivity disorder (ADHD), combined type     Allergy:   No Known Allergies     Discontinued Medications:  There are no discontinued medications.    Current Medications:   Current Outpatient Medications   Medication Sig Dispense Refill    amphetamine-dextroamphetamine XR (Adderall XR) 10 MG 24 hr capsule Take 1 capsule by mouth Every Morning 30 capsule 0     No current facility-administered medications for this visit.     Past Medical History:  Past Medical History:   Diagnosis Date    Allergic      Past Surgical History:  No past surgical history on file.    MENTAL STATUS EXAM   General Appearance:  Cleanly groomed and dressed and well developed  Eye Contact:  Good eye contact  Attitude:  Cooperative, polite and candid  Motor Activity:  Normal gait, posture  Muscle Strength:  Normal  Speech:  Normal rate, tone,  "volume  Language:  Spontaneous  Mood and affect:  Normal, pleasant and happy  Hopelessness:  Denies  Loneliness: Denies  Thought Process:  Logical and goal-directed  Associations/ Thought Content:  No delusions  Hallucinations:  None  Suicidal Ideations:  Not present  Homicidal Ideation:  Not present  Sensorium:  Alert and clear  Orientation:  Person, time, situation and place  Immediate Recall, Recent, and Remote Memory:  Intact  Attention Span/ Concentration:  Good  Fund of Knowledge:  Appropriate for age and educational level  Intellectual Functioning:  Average range  Insight:  Good  Judgement:  Good  Reliability:  Good  Impulse Control:  Good      Vital Signs:   /72   Pulse 79   Ht 172.7 cm (68\")   Wt 73.3 kg (161 lb 9.6 oz)   BMI 24.57 kg/m²    Lab Results:   No visits with results within 6 Month(s) from this visit.   Latest known visit with results is:   Lab on 01/17/2024   Component Date Value Ref Range Status    Amphet/Methamphet, Screen 01/17/2024 Negative  Negative Final    Barbiturates Screen, Urine 01/17/2024 Negative  Negative Final    Benzodiazepine Screen, Urine 01/17/2024 Negative  Negative Final    Cocaine Screen, Urine 01/17/2024 Negative  Negative Final    Opiate Screen 01/17/2024 Negative  Negative Final    THC, Screen, Urine 01/17/2024 Negative  Negative Final    Methadone Screen, Urine 01/17/2024 Negative  Negative Final    Oxycodone Screen, Urine 01/17/2024 Negative  Negative Final    Fentanyl, Urine 01/17/2024 Negative  Negative Final       ASSESSMENT AND PLAN:    ICD-10-CM ICD-9-CM   1. Attention deficit hyperactivity disorder (ADHD), combined type  F90.2 314.01   2. Autism disorder  F84.0 299.00       Abhijit is a 18 y.o. male who presents today for follow-up regarding medication management. We have discussed the interval history and the treatment plan below, including potential R/B/SE of the recommended regimen of which the patient demonstrates understanding. Patient is agreeable " to call 911 or go to the nearest ER should he become concerned for his own safety and/or the safety of those around him. There are are no overt indices of acute madison/psychosis on exam today.   Medication regimen: Once Chandler results plan to increase dose of amphetamine dextroamphetamine XR to 15 mg p.o. every morning; patient is advised not to misuse prescribed medications or to use them with any exogenous substances that aren't disclosed to this provider as they may interact with the regimen to the patient's detriment.   Risk Assessment: protracted risk is moderate, imminent risk is low moderate do note that this is subject to change with the Congregational of new stressors, treatment non-adherence, use of substances, and/or new medical ails.   Monitoring: reviewed labs/imaging as populated above; ordered  Therapy: Deferred  Follow-up: 3 months via My Chart or In person  Communications: N/A    TREATMENT PLAN/GOALS: challenge patterns of living conducive to symptom burden, implement recommended regimen as above with augmentative, intermittent supportive psychotherapy to reduce symptom burden. Patient acknowledged and verbally consented to continue treatment. The importance of adherence to the recommended treatment and interval follow-up appointments was again emphasized today: patient has fair treatment adherence per given history. Patient was today reminded to limit daily caffeine intake, hydrate appropriately, eat healthy and nutritious foods, engage sleep hygiene measures, engage appropriate exposure to sunlight, engage with hobbies in balance with life necessities, and exercise appropriate to their capacity to do so.       Parts of this note are electronic transcriptions/translations of spoken language to printed text using the Dragon Dictation system.    Electronically signed by LAURA Duenas, 08/07/24

## 2024-08-09 DIAGNOSIS — F90.2 ATTENTION DEFICIT HYPERACTIVITY DISORDER (ADHD), COMBINED TYPE: Primary | ICD-10-CM

## 2024-08-09 RX ORDER — DEXTROAMPHETAMINE SACCHARATE, AMPHETAMINE ASPARTATE MONOHYDRATE, DEXTROAMPHETAMINE SULFATE AND AMPHETAMINE SULFATE 3.75; 3.75; 3.75; 3.75 MG/1; MG/1; MG/1; MG/1
15 CAPSULE, EXTENDED RELEASE ORAL EVERY MORNING
Qty: 30 CAPSULE | Refills: 0 | Status: SHIPPED | OUTPATIENT
Start: 2024-08-09 | End: 2024-09-08

## 2024-09-10 ENCOUNTER — TELEPHONE (OUTPATIENT)
Dept: PSYCHIATRY | Facility: CLINIC | Age: 19
End: 2024-09-10
Payer: OTHER GOVERNMENT

## 2024-09-10 NOTE — TELEPHONE ENCOUNTER
PATIENT WAS REFERRED OUT TO PSYCHOLOGY, SEVERAL ATTEMPT MADE TO FOLLOW UP ON REFERRAL ORDER INCLUDING MAILING CONTACT LETTER WITH NO SUCCESS, CLOSING OUT REFERRAL ORDER,.

## 2024-10-14 DIAGNOSIS — F90.2 ATTENTION DEFICIT HYPERACTIVITY DISORDER (ADHD), COMBINED TYPE: ICD-10-CM

## 2024-10-14 NOTE — TELEPHONE ENCOUNTER
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

## 2024-10-21 RX ORDER — DEXTROAMPHETAMINE SACCHARATE, AMPHETAMINE ASPARTATE MONOHYDRATE, DEXTROAMPHETAMINE SULFATE AND AMPHETAMINE SULFATE 3.75; 3.75; 3.75; 3.75 MG/1; MG/1; MG/1; MG/1
15 CAPSULE, EXTENDED RELEASE ORAL EVERY MORNING
Qty: 30 CAPSULE | Refills: 0 | Status: SHIPPED | OUTPATIENT
Start: 2024-10-21 | End: 2024-11-20

## 2024-12-11 NOTE — PROGRESS NOTES
"[facility]  Follow-up Visit    Chief Complaint:  \"I think I have ADHD\"      History of Present Illness: Aneudy Gallegos is a whip-smart 18 y.o. male who presents today for initial evaluation regarding his desire to be screened for ADHD.  Mr. Gallegos presents unaccompanied in no acute distress and engages with me appropriately, he has strategically situated himself behind my double computer screens to avoid eye contact.  Psychotropic regimen with which patient presents is described as nothing.      History is positive for signs/symptoms suggestive of ADHD, and ASD. With regard to ADHD: I am presumptively treating patient for this issue; history as provided today, presentation today, his co-morbid diagnosis of Autism Spectrum Disorder would suggest a distinct possibility this is a contributing factor to patient's dysfunction in  roles and engagements irrespective to screening results. Patient has learned and successfully implemented compensatory coping skills that, with the Episcopalian of layered stressors throughout adulthood, have begun to fail. Treating ADHD symptoms will likely be a concise and appropriate route to address anxiety and mood issues that are, at least to some degree, secondary to deficits related to traits of ADHD. Patient given options of slower-onset medications with low risk, vs. Stimulant-amphetamine based medications that can cause addiction, and tolerance, and currently on national shortage.   Psychiatric screening is negative for pathognomonic history of: TBI, depression, anxiety, PTSD, madison, psychosis, violence, and suicidality.      I have counseled the patient with regard to diagnoses and the recommended treatment regimen as documented below: I will assume prescriptive responsibility for anticipated ADHD agent. Patient acknowledges the diagnoses per my rendered interpretation. Patient demonstrates awareness/understanding of viable alternatives for " treatment as well as potential risks, benefits, and side effects associated with this regimen and is amenable to proceed in this fashion.      Recommended lifestyle changes: increase socializing, brisk 30 minute walks 3 days a week, consider phone application to maximize executive functioning.      Psychiatric History:  Diagnoses: Autism Spectrum Disorder  Outpatient history: in the home as child with ASD  Inpatient history: none  Medication trials: none  Other treatment modalities: none  Presenting regimen:none  Self harm: none  Suicide attempts: none     Substance Abuse History:   Types/methods/frequency: none  Withdrawal history: none  Sobriety: none  Transtheoretical stage: none     Social History:  Residence: lives house with mom, boyfriend, and nephew  Vocation: car place at [employer]  Education: 12 th grade, some trade school  Pertinent developmental history: ASD  Pertinent legal history: none  Hobbies/interests: radha,   Rastafari: none  Exercise: none  Dietary habits: no pertinent issues  Sleep hygiene: stays up late  Social habits: meeting people at new job  Sunlight: underexposed  Caffeine intake: varies  Hydration habits: not enough   history: family hx            Interval History Abhijit is a 18 y.o. male who presents today for follow-up. Abhijit presents unaccompanied in no acute distress and engages with me appropriately.   Current treatment regimen includes:   -Adderall XR 15 mg  Side-effects per given history: None.      Today the patient feels happy. Gary is quiet this morning but pleasant.  He states that he will be working at [employer] over his winter break and returns to [place] for college mid-January.  Patient states that he is unsure of how to set up my chart or he would have seen me sooner for his prescription.  his thought process and content are devoid of overt aberration suggestive of acute madison/psychosis. The patient denies SI/HI/AVH. There are not changes on exam today compared  to most recent evaluation.    - sleep: no concerns  - appetite: moderately controlled  Encouraged Gary to research how to set up is my chart or asked the  for assistance. Encouraged joslyn to reach out to his pharmacy to check when medications are ready for .  Continued Adderall XR 15 mg p.o. every morning.   I have counseled the patient with regard to diagnoses and the recommended treatment regimen as documented below. Patient acknowledges the diagnoses per my rendered interpretation. Patient demonstrates understanding of potential risks/benefits/side effects associated with this regimen and is amenable to proceed in this fashion.       Social History     Socioeconomic History    Marital status: Single   Tobacco Use    Smoking status: Never    Smokeless tobacco: Never   Vaping Use    Vaping status: Never Used   Substance and Sexual Activity    Alcohol use: Never    Drug use: Never    Sexual activity: Defer       Tobacco use counseling/intervention: patient does not use tobacco.   Problem List:  Patient Active Problem List   Diagnosis    Autism disorder    Attention deficit hyperactivity disorder (ADHD), combined type     Allergy:   No Known Allergies     Discontinued Medications:  There are no discontinued medications.    Current Medications:   Current Outpatient Medications   Medication Sig Dispense Refill    amphetamine-dextroamphetamine XR (Adderall XR) 15 MG 24 hr capsule Take 1 capsule by mouth Every Morning for 30 days 30 capsule 0     No current facility-administered medications for this visit.     Past Medical History:  Past Medical History:   Diagnosis Date    Allergic     Autism spectrum disorder      Past Surgical History:  History reviewed. No pertinent surgical history.    MENTAL STATUS EXAM   General Appearance:  Cleanly groomed and dressed and well developed  Eye Contact:  Downcast and poor eye contact  Attitude:  Cooperative and polite  Motor Activity:  Fidgety  Muscle Strength:  " Normal  Speech:  Normal rate, tone, volume  Language:  Spontaneous  Mood and affect:  Normal, pleasant  Hopelessness:  Denies  Loneliness: Denies  Thought Process:  Linear and logical  Associations/ Thought Content:  No delusions  Hallucinations:  None  Suicidal Ideations:  Not present  Homicidal Ideation:  Not present  Sensorium:  Alert and clear  Orientation:  Person, place, time and situation  Immediate Recall, Recent, and Remote Memory:  Intact  Attention Span/ Concentration:  Good  Fund of Knowledge:  Appropriate for age and educational level  Intellectual Functioning:  Average range  Insight:  Good  Judgement:  Good  Reliability:  Good  Impulse Control:  Good      Vital Signs:   /78   Ht 172.7 cm (68\")   Wt 69.7 kg (153 lb 9.6 oz)   BMI 23.35 kg/m²    Lab Results:   No visits with results within 6 Month(s) from this visit.   Latest known visit with results is:   Lab on 01/17/2024   Component Date Value Ref Range Status    Amphet/Methamphet, Screen 01/17/2024 Negative  Negative Final    Barbiturates Screen, Urine 01/17/2024 Negative  Negative Final    Benzodiazepine Screen, Urine 01/17/2024 Negative  Negative Final    Cocaine Screen, Urine 01/17/2024 Negative  Negative Final    Opiate Screen 01/17/2024 Negative  Negative Final    THC, Screen, Urine 01/17/2024 Negative  Negative Final    Methadone Screen, Urine 01/17/2024 Negative  Negative Final    Oxycodone Screen, Urine 01/17/2024 Negative  Negative Final    Fentanyl, Urine 01/17/2024 Negative  Negative Final       ASSESSMENT AND PLAN:    ICD-10-CM ICD-9-CM   1. Attention deficit hyperactivity disorder (ADHD), combined type  F90.2 314.01   2. Autism disorder  F84.0 299.00       Abhijit is a 18 y.o. male who presents today for follow-up regarding medication check. . We have discussed the interval history and the treatment plan below, including potential R/B/SE of the recommended regimen of which the patient demonstrates understanding. Patient is " agreeable to call 911 or go to the nearest ER should he become concerned for his own safety and/or the safety of those around him. There are are no overt indices of acute madison/psychosis on exam today. Chandler queued for review.     Medication regimen: continue Adderall XR 15 mg po q am; patient is advised not to misuse prescribed medications or to use them with any exogenous substances that aren't disclosed to this provider as they may interact with the regimen to the patient's detriment.   Risk Assessment: protracted risk is moderate, imminent risk is low. Do note that this is subject to change with the Mormonism of new stressors, treatment non-adherence, use of substances, and/or new medical ails.   Monitoring: reviewed labs/imaging as populated above; ordered  Therapy: deferred/declined  Follow-up: 3 months  Communications: N/A    TREATMENT PLAN/GOALS: challenge patterns of living conducive to symptom burden, implement recommended regimen as above with augmentative, intermittent supportive psychotherapy to reduce symptom burden. Patient acknowledged and verbally consented to continue treatment. The importance of adherence to the recommended treatment and interval follow-up appointments was again emphasized today: patient has fair treatment adherence per given history. Patient was today reminded to limit daily caffeine intake, hydrate appropriately, eat healthy and nutritious foods, engage sleep hygiene measures, engage appropriate exposure to sunlight, engage with hobbies in balance with life necessities, and exercise appropriate to their capacity to do so.       Parts of this note are electronic transcriptions/translations of spoken language to printed text using the Dragon Dictation system.    Electronically signed by LAURA Duenas, 12/11/24

## 2024-12-12 ENCOUNTER — OFFICE VISIT (OUTPATIENT)
Dept: PSYCHIATRY | Facility: CLINIC | Age: 19
End: 2024-12-12
Payer: OTHER GOVERNMENT

## 2024-12-12 VITALS
SYSTOLIC BLOOD PRESSURE: 126 MMHG | BODY MASS INDEX: 23.28 KG/M2 | HEIGHT: 68 IN | WEIGHT: 153.6 LBS | DIASTOLIC BLOOD PRESSURE: 78 MMHG

## 2024-12-12 DIAGNOSIS — F84.0 AUTISM DISORDER: ICD-10-CM

## 2024-12-12 DIAGNOSIS — F90.2 ATTENTION DEFICIT HYPERACTIVITY DISORDER (ADHD), COMBINED TYPE: Primary | ICD-10-CM

## 2024-12-12 RX ORDER — DEXTROAMPHETAMINE SACCHARATE, AMPHETAMINE ASPARTATE MONOHYDRATE, DEXTROAMPHETAMINE SULFATE AND AMPHETAMINE SULFATE 3.75; 3.75; 3.75; 3.75 MG/1; MG/1; MG/1; MG/1
15 CAPSULE, EXTENDED RELEASE ORAL EVERY MORNING
Qty: 30 CAPSULE | Refills: 0 | Status: SHIPPED | OUTPATIENT
Start: 2024-12-12 | End: 2025-01-11

## 2025-01-17 DIAGNOSIS — F90.2 ATTENTION DEFICIT HYPERACTIVITY DISORDER (ADHD), COMBINED TYPE: ICD-10-CM

## 2025-01-17 RX ORDER — DEXTROAMPHETAMINE SACCHARATE, AMPHETAMINE ASPARTATE MONOHYDRATE, DEXTROAMPHETAMINE SULFATE AND AMPHETAMINE SULFATE 3.75; 3.75; 3.75; 3.75 MG/1; MG/1; MG/1; MG/1
15 CAPSULE, EXTENDED RELEASE ORAL EVERY MORNING
Qty: 30 CAPSULE | Refills: 0 | Status: SHIPPED | OUTPATIENT
Start: 2025-01-17 | End: 2025-02-16

## 2025-01-17 NOTE — TELEPHONE ENCOUNTER
PT PHONED IN.    MED REFILL; PHARMACY CHANGE.    amphetamine-dextroamphetamine XR (Adderall XR) 15 MG 24 hr capsule (12/12/2024)     F/UP- 03/12/2025.  LOV: 12/12/2024.    Urine Drug Screen - Urine, Clean Catch (01/17/2024 16:51)

## 2025-02-18 DIAGNOSIS — F90.2 ATTENTION DEFICIT HYPERACTIVITY DISORDER (ADHD), COMBINED TYPE: ICD-10-CM

## 2025-02-19 RX ORDER — DEXTROAMPHETAMINE SACCHARATE, AMPHETAMINE ASPARTATE MONOHYDRATE, DEXTROAMPHETAMINE SULFATE AND AMPHETAMINE SULFATE 3.75; 3.75; 3.75; 3.75 MG/1; MG/1; MG/1; MG/1
15 CAPSULE, EXTENDED RELEASE ORAL EVERY MORNING
Qty: 30 CAPSULE | Refills: 0 | Status: SHIPPED | OUTPATIENT
Start: 2025-02-19 | End: 2025-03-21

## 2025-02-19 NOTE — TELEPHONE ENCOUNTER
CONTROLLED MEDICATION REFILL REQUEST    STATE REGULATION APPT EVERY 3 MONTHS     UDS(URINE DRUG SCREEN) EVERY 6 MONTHS OR UP TO PROVIDER PREFERENCE   (DEX MIN & MAYELIN 1 PER YEAR)     NEW NARC CONSENT EVERY YEAR      MEDICATION: amphetamine-dextroamphetamine XR (Adderall XR) 15 MG 24 hr capsule (01/17/2025)      NEXT OFFICE VISIT: Appointment with Flower Robertson APRN (03/12/2025)     LAST OFFICE VISIT: Office Visit with Flower Robertson APRN (12/12/2024)     NARC CONSENT: CONTROLLED SUBSTANCE AGREEMENT - SCAN - CONTROLLED SUBSTANCE AGREEMENT, [facility] [address], [phone] (01/17/2024)     URINE DRUG SCREEN(STANDING ORDER)   (DEX MIN & MAYELIN 1 PER YEAR): Urine Drug Screen - Urine, Clean Catch (01/17/2024 16:51)     PROVIDER PLEASE ADVISE

## 2025-03-12 ENCOUNTER — TELEMEDICINE (OUTPATIENT)
Dept: PSYCHIATRY | Facility: CLINIC | Age: 20
End: 2025-03-12
Payer: OTHER GOVERNMENT

## 2025-03-12 DIAGNOSIS — F84.0 AUTISM DISORDER: ICD-10-CM

## 2025-03-12 DIAGNOSIS — F90.2 ATTENTION DEFICIT HYPERACTIVITY DISORDER (ADHD), COMBINED TYPE: Primary | ICD-10-CM

## 2025-03-12 RX ORDER — DEXTROAMPHETAMINE SACCHARATE, AMPHETAMINE ASPARTATE MONOHYDRATE, DEXTROAMPHETAMINE SULFATE AND AMPHETAMINE SULFATE 3.75; 3.75; 3.75; 3.75 MG/1; MG/1; MG/1; MG/1
15 CAPSULE, EXTENDED RELEASE ORAL EVERY MORNING
Qty: 30 CAPSULE | Refills: 0 | Status: SHIPPED | OUTPATIENT
Start: 2025-03-18 | End: 2025-04-17

## 2025-03-12 NOTE — PROGRESS NOTES
"[facility]  Follow-up Visit    Chief Complaint:  \"I think I have ADHD\"      History of Present Illness: Aneudy Gallegos is a whip-smart 18 y.o. male who presents today for initial evaluation regarding his desire to be screened for ADHD.  Mr. Gallegos presents unaccompanied in no acute distress and engages with me appropriately, he has strategically situated himself behind my double computer screens to avoid eye contact.  Psychotropic regimen with which patient presents is described as nothing.      History is positive for signs/symptoms suggestive of ADHD, and ASD. With regard to ADHD: I am presumptively treating patient for this issue; history as provided today, presentation today, his co-morbid diagnosis of Autism Spectrum Disorder would suggest a distinct possibility this is a contributing factor to patient's dysfunction in  roles and engagements irrespective to screening results. Patient has learned and successfully implemented compensatory coping skills that, with the Hindu of layered stressors throughout adulthood, have begun to fail. Treating ADHD symptoms will likely be a concise and appropriate route to address anxiety and mood issues that are, at least to some degree, secondary to deficits related to traits of ADHD. Patient given options of slower-onset medications with low risk, vs. Stimulant-amphetamine based medications that can cause addiction, and tolerance, and currently on national shortage.   Psychiatric screening is negative for pathognomonic history of: TBI, depression, anxiety, PTSD, madison, psychosis, violence, and suicidality.      I have counseled the patient with regard to diagnoses and the recommended treatment regimen as documented below: I will assume prescriptive responsibility for anticipated ADHD agent. Patient acknowledges the diagnoses per my rendered interpretation. Patient demonstrates awareness/understanding of viable alternatives for " "treatment as well as potential risks, benefits, and side effects associated with this regimen and is amenable to proceed in this fashion.      Recommended lifestyle changes: increase socializing, brisk 30 minute walks 3 days a week, consider phone application to maximize executive functioning.      Psychiatric History:  Diagnoses: Autism Spectrum Disorder  Outpatient history: in the home as child with ASD  Inpatient history: none  Medication trials: none  Other treatment modalities: none  Presenting regimen:none  Self harm: none  Suicide attempts: none     Substance Abuse History:   Types/methods/frequency: none  Withdrawal history: none  Sobriety: none  Transtheoretical stage: none     Social History:  Residence: lives house with mom, boyfriend, and nephew  Vocation: car place at [employer]  Education: 12 th grade, some trade school  Pertinent developmental history: ASD  Pertinent legal history: none  Hobbies/interests: radha,   Denominational: none  Exercise: none  Dietary habits: no pertinent issues  Sleep hygiene: stays up late  Social habits: meeting people at new job  Sunlight: underexposed  Caffeine intake: varies  Hydration habits: not enough   history: family hx            Interval History Abhijit is a 19 y.o. male who presents today for follow-up. Abhijit presents unaccompanied in no acute distress and engages with me appropriately.   Current treatment regimen includes:   -Adderall XR 15 mg p.o. a.m.  Side-effects per given history: None      Today the patient feels \"good.\"  He is at school currently and doing well.  He has no plans for spring break, he is staying on campus to work. thought process and content are devoid of overt aberration suggestive of acute madison/psychosis. The patient denies SI/HI/AVH. There are not changes on exam today compared to most recent evaluation.    - sleep: no concerns  - appetite: moderately controlled  Continue current regimen  I have counseled the patient with regard to " diagnoses and the recommended treatment regimen as documented below. Patient acknowledges the diagnoses per my rendered interpretation. Patient demonstrates understanding of potential risks/benefits/side effects associated with this regimen and is amenable to proceed in this fashion.       Social History     Socioeconomic History    Marital status: Single   Tobacco Use    Smoking status: Never    Smokeless tobacco: Never   Vaping Use    Vaping status: Never Used   Substance and Sexual Activity    Alcohol use: Never    Drug use: Never    Sexual activity: Defer       Tobacco use counseling/intervention: patient does not use tobacco.     Problem List:  Patient Active Problem List   Diagnosis    Autism disorder    Attention deficit hyperactivity disorder (ADHD), combined type     Allergy:   No Known Allergies     Discontinued Medications:  Medications Discontinued During This Encounter   Medication Reason    amphetamine-dextroamphetamine XR (Adderall XR) 15 MG 24 hr capsule Reorder       Current Medications:   Current Outpatient Medications   Medication Sig Dispense Refill    [START ON 3/18/2025] amphetamine-dextroamphetamine XR (Adderall XR) 15 MG 24 hr capsule Take 1 capsule by mouth Every Morning for 30 days 30 capsule 0     No current facility-administered medications for this visit.     Past Medical History:  Past Medical History:   Diagnosis Date    Allergic     Autism spectrum disorder      Past Surgical History:  No past surgical history on file.    MENTAL STATUS EXAM   General Appearance:  Cleanly groomed and dressed  Eye Contact:  Good eye contact  Attitude:  Cooperative, polite and candid  Motor Activity:  Normal gait, posture  Muscle Strength:  Normal  Speech:  Normal rate, tone, volume  Language:  Spontaneous  Mood and affect:  Normal, pleasant  Hopelessness:  Denies  Loneliness: Denies  Thought Process:  Logical and goal-directed  Associations/ Thought Content:  No delusions  Hallucinations:   None  Suicidal Ideations:  Not present  Homicidal Ideation:  Not present  Sensorium:  Alert and clear  Orientation:  Person, place, time and situation  Immediate Recall, Recent, and Remote Memory:  Intact  Attention Span/ Concentration:  Good  Fund of Knowledge:  Appropriate for age and educational level  Intellectual Functioning:  Average range  Insight:  Good  Judgement:  Good  Reliability:  Good  Impulse Control:  Good      Vital Signs:   There were no vitals taken for this visit.   Lab Results:   No visits with results within 6 Month(s) from this visit.   Latest known visit with results is:   Lab on 01/17/2024   Component Date Value Ref Range Status    Amphet/Methamphet, Screen 01/17/2024 Negative  Negative Final    Barbiturates Screen, Urine 01/17/2024 Negative  Negative Final    Benzodiazepine Screen, Urine 01/17/2024 Negative  Negative Final    Cocaine Screen, Urine 01/17/2024 Negative  Negative Final    Opiate Screen 01/17/2024 Negative  Negative Final    THC, Screen, Urine 01/17/2024 Negative  Negative Final    Methadone Screen, Urine 01/17/2024 Negative  Negative Final    Oxycodone Screen, Urine 01/17/2024 Negative  Negative Final    Fentanyl, Urine 01/17/2024 Negative  Negative Final       ASSESSMENT AND PLAN:    ICD-10-CM ICD-9-CM   1. Attention deficit hyperactivity disorder (ADHD), combined type  F90.2 314.01   2. Autism disorder  F84.0 299.00       Abhijit is a 19 y.o. male who presents today for follow-up regarding medication management. We have discussed the interval history and the treatment plan below, including potential R/B/SE of the recommended regimen of which the patient demonstrates understanding. Patient is agreeable to call 911 or go to the nearest ER should he become concerned for his own safety and/or the safety of those around him. There are are no overt indices of acute madison/psychosis on exam today. JOBY reviewed and is as expected.    Medication regimen: Continue Adderall XR 15 mg  p.o. daily; patient is advised not to misuse prescribed medications or to use them with any exogenous substances that aren't disclosed to this provider as they may interact with the regimen to the patient's detriment.   Risk Assessment: protracted risk is moderate, imminent risk is low. do note that this is subject to change with the Evangelical of new stressors, treatment non-adherence, use of substances, and/or new medical ails.   Monitoring: reviewed labs/imaging as populated above; ordered  Therapy: Deferred declined  Follow-up: 3 months  Communications: N/A    TREATMENT PLAN/GOALS: challenge patterns of living conducive to symptom burden, implement recommended regimen as above with augmentative, intermittent supportive psychotherapy to reduce symptom burden. Patient acknowledged and verbally consented to continue treatment. The importance of adherence to the recommended treatment and interval follow-up appointments was again emphasized today: patient has good treatment adherence per given history. Patient was today reminded to limit daily caffeine intake, hydrate appropriately, eat healthy and nutritious foods, engage sleep hygiene measures, engage appropriate exposure to sunlight, engage with hobbies in balance with life necessities, and exercise appropriate to their capacity to do so.       Parts of this note are electronic transcriptions/translations of spoken language to printed text using the Dragon Dictation system.    Electronically signed by LAURA Duenas, 03/12/25

## 2025-03-13 ENCOUNTER — TELEPHONE (OUTPATIENT)
Dept: PSYCHIATRY | Facility: CLINIC | Age: 20
End: 2025-03-13
Payer: OTHER GOVERNMENT

## 2025-03-25 DIAGNOSIS — F90.2 ATTENTION DEFICIT HYPERACTIVITY DISORDER (ADHD), COMBINED TYPE: ICD-10-CM

## 2025-03-25 RX ORDER — DEXTROAMPHETAMINE SACCHARATE, AMPHETAMINE ASPARTATE MONOHYDRATE, DEXTROAMPHETAMINE SULFATE AND AMPHETAMINE SULFATE 3.75; 3.75; 3.75; 3.75 MG/1; MG/1; MG/1; MG/1
15 CAPSULE, EXTENDED RELEASE ORAL EVERY MORNING
Qty: 30 CAPSULE | Refills: 0 | OUTPATIENT
Start: 2025-03-25 | End: 2025-04-24

## 2025-04-02 DIAGNOSIS — F90.2 ATTENTION DEFICIT HYPERACTIVITY DISORDER (ADHD), COMBINED TYPE: ICD-10-CM

## 2025-04-02 RX ORDER — DEXTROAMPHETAMINE SACCHARATE, AMPHETAMINE ASPARTATE MONOHYDRATE, DEXTROAMPHETAMINE SULFATE AND AMPHETAMINE SULFATE 3.75; 3.75; 3.75; 3.75 MG/1; MG/1; MG/1; MG/1
15 CAPSULE, EXTENDED RELEASE ORAL EVERY MORNING
Qty: 30 CAPSULE | Refills: 0 | Status: CANCELLED | OUTPATIENT
Start: 2025-04-02 | End: 2025-05-02

## 2025-04-03 NOTE — TELEPHONE ENCOUNTER
ATTEMPTED TO CONTACT PT(PATIENT)      UNABLE TO LEAVE A VOICEMAIL WITH INSTRUCTIONS TO RETURN CALL TO OFFICE AT [phone]

## 2025-04-03 NOTE — TELEPHONE ENCOUNTER
CONTROLLED MEDICATION REFILL REQUEST    STATE REGULATION APPT EVERY 3 MONTHS     UDS(URINE DRUG SCREEN) EVERY 6 MONTHS OR UP TO PROVIDER PREFERENCE   (DE PAKO & DICK 1 PER YEAR)     NEW NARC CONSENT EVERY YEAR      MEDICATION: amphetamine-dextroamphetamine XR (Adderall XR) 15 MG 24 hr capsule (03/18/2025)      NEXT OFFICE VISIT: NONE IN EPIC     LAST OFFICE VISIT: Telemedicine with Flower Robertson APRN (03/12/2025)     NARC CONSENT: CONTROLLED SUBSTANCE AGREEMENT - SCAN - CONTROLLED SUBSTANCE AGREEMENT, [facility], [phone] (01/17/2024)     URINE DRUG SCREEN(STANDING ORDER)   (DEX MIN & DICK 1 PER YEAR): Urine Drug Screen - Urine, Clean Catch (01/17/2024 16:51)

## 2025-04-07 NOTE — TELEPHONE ENCOUNTER
Flower Robertson APRN to [facility] Clinical Pool (Selected Message)        4/7/25  4:39 PM  Did [facility] ever fill this med?

## 2025-04-07 NOTE — TELEPHONE ENCOUNTER
ATTEMPTED TO CONTACT PT(PATIENT)      UNABLE TO LEAVE A VOICEMAIL WITH INSTRUCTIONS TO RETURN CALL TO OFFICE AT [phone]

## 2025-04-09 DIAGNOSIS — F90.2 ATTENTION DEFICIT HYPERACTIVITY DISORDER (ADHD), COMBINED TYPE: Primary | ICD-10-CM

## 2025-04-09 RX ORDER — DEXTROAMPHETAMINE SACCHARATE, AMPHETAMINE ASPARTATE MONOHYDRATE, DEXTROAMPHETAMINE SULFATE AND AMPHETAMINE SULFATE 3.75; 3.75; 3.75; 3.75 MG/1; MG/1; MG/1; MG/1
15 CAPSULE, EXTENDED RELEASE ORAL DAILY
Qty: 30 CAPSULE | Refills: 0 | Status: SHIPPED | OUTPATIENT
Start: 2025-04-09 | End: 2025-05-09

## 2025-05-18 DIAGNOSIS — F90.2 ATTENTION DEFICIT HYPERACTIVITY DISORDER (ADHD), COMBINED TYPE: ICD-10-CM

## 2025-05-19 NOTE — TELEPHONE ENCOUNTER
REFILL REQUEST:    amphetamine-dextroamphetamine XR (Adderall XR) 15 MG 24 hr capsule (04/09/2025)     F/UP: NONE IN EPIC.  LOV: 03/12/2025.    LAST UDS:  Urine Drug Screen - Urine, Clean Catch (01/17/2024 16:51)     I ATTEMPTED TO CONTACT PT VIA TELEPHONE.    NO ANSWER.    NUMBER RANG AND RANG AND THEN CUT OFF.

## 2025-05-20 RX ORDER — DEXTROAMPHETAMINE SACCHARATE, AMPHETAMINE ASPARTATE MONOHYDRATE, DEXTROAMPHETAMINE SULFATE AND AMPHETAMINE SULFATE 3.75; 3.75; 3.75; 3.75 MG/1; MG/1; MG/1; MG/1
15 CAPSULE, EXTENDED RELEASE ORAL DAILY
Qty: 30 CAPSULE | Refills: 0 | Status: SHIPPED | OUTPATIENT
Start: 2025-05-20 | End: 2025-06-19

## 2025-05-20 NOTE — TELEPHONE ENCOUNTER
JOBY REPORT 05/20/2025.    [facility]; 05/20/2025. (05/20/2025)     ALSO PLACED IN PROVIDERS MAILBOX.

## 2025-05-20 NOTE — TELEPHONE ENCOUNTER
Flower Robertson, APRN to [facility] Clinical Pool      5/20/25  8:40 AM  Need manual [place] please.

## 2025-05-20 NOTE — TELEPHONE ENCOUNTER
I ATTEMPTED TO CONTACT PT VIA TELEPHONE.    PT ANSWERED.    PT SCHEDULED FOLLOW UP FOR 06/12/2025.    ROUTING REFILL OVER TO PROVIDER NOW.

## 2025-06-10 NOTE — PROGRESS NOTES
"[facility]  Follow-up Visit    Chief Complaint:  \"I think I have ADHD\"      History of Present Illness: Aneudy Gallegos is a whip-smart 18 y.o. male who presents today for initial evaluation regarding his desire to be screened for ADHD.  Mr. Gallegos presents unaccompanied in no acute distress and engages with me appropriately, he has strategically situated himself behind my double computer screens to avoid eye contact.  Psychotropic regimen with which patient presents is described as nothing.      History is positive for signs/symptoms suggestive of ADHD, and ASD. With regard to ADHD: I am presumptively treating patient for this issue; history as provided today, presentation today, his co-morbid diagnosis of Autism Spectrum Disorder would suggest a distinct possibility this is a contributing factor to patient's dysfunction in  roles and engagements irrespective to screening results. Patient has learned and successfully implemented compensatory coping skills that, with the Ep[place] of layered stressors throughout adulthood, have begun to fail. Treating ADHD symptoms will likely be a concise and appropriate route to address anxiety and mood issues that are, at least to some degree, secondary to deficits related to traits of ADHD. Patient given options of slower-onset medications with low risk, vs. Stimulant-amphetamine based medications that can cause addiction, and tolerance, and currently on national shortage.   Psychiatric screening is negative for pathognomonic history of: TBI, depression, anxiety, PTSD, madison, psychosis, violence, and suicidality.      I have counseled the patient with regard to diagnoses and the recommended treatment regimen as documented below: I will assume prescriptive responsibility for anticipated ADHD agent. Patient acknowledges the diagnoses per my rendered interpretation. Patient demonstrates awareness/understanding of viable alternatives for " "treatment as well as potential risks, benefits, and side effects associated with this regimen and is amenable to proceed in this fashion.      Recommended lifestyle changes: increase socializing, brisk 30 minute walks 3 days a week, consider phone application to maximize executive functioning.      Psychiatric History:  Diagnoses: Autism Spectrum Disorder  Outpatient history: in the home as child with ASD  Inpatient history: none  Medication trials: none  Other treatment modalities: none  Presenting regimen:none  Self harm: none  Suicide attempts: none     Substance Abuse History:   Types/methods/frequency: none  Withdrawal history: none  Sobriety: none  Transtheoretical stage: none     Social History:  Residence: lives house with mom, boyfriend, and nephew  Vocation: car place at [employer]  Education: 12 th grade, some trade school  Pertinent developmental history: ASD  Pertinent legal history: none  Hobbies/interests: radha,   Church: none  Exercise: none  Dietary habits: no pertinent issues  Sleep hygiene: stays up late  Social habits: meeting people at new job  Sunlight: underexposed  Caffeine intake: varies  Hydration habits: not enough   history: family hx            Interval History Abhijit is a 19 y.o. male who presents today for follow-up. Abhijit presents unaccompanied in no acute distress and engages with me appropriately.   Current treatment regimen includes:   - Adderrall XR 15 mg po q am   Side-effects per given history: none.      Today the patient feels \"alright.\" He reports that he is working and studying. He reports that school is going well and he is on summer break.  Thought process and content are devoid of overt aberration suggestive of acute madison/psychosis. The patient denies SI/HI/AVH. There are not changes on exam today compared to most recent evaluation.    - sleep: no concerns  - appetite: moderately controlled   We will continue Adderall XR 15 mg po q am.   I have counseled the " patient with regard to diagnoses and the recommended treatment regimen as documented below. Patient acknowledges the diagnoses per my rendered interpretation. Patient demonstrates understanding of potential risks/benefits/side effects associated with this regimen and is amenable to proceed in this fashion.       Social History     Socioeconomic History    Marital status: Single   Tobacco Use    Smoking status: Never    Smokeless tobacco: Never   Vaping Use    Vaping status: Never Used   Substance and Sexual Activity    Alcohol use: Never    Drug use: Never    Sexual activity: Defer       Tobacco use counseling/intervention: patient does not use tobacco.     Problem List:  Patient Active Problem List   Diagnosis    Autism disorder    Attention deficit hyperactivity disorder (ADHD), combined type     Allergy:   No Known Allergies     Discontinued Medications:  There are no discontinued medications.    Current Medications:   Current Outpatient Medications   Medication Sig Dispense Refill    amphetamine-dextroamphetamine XR (Adderall XR) 15 MG 24 hr capsule Take 1 capsule by mouth Daily for 30 days 30 capsule 0     No current facility-administered medications for this visit.     Past Medical History:  Past Medical History:   Diagnosis Date    Allergic     Autism spectrum disorder      Past Surgical History:  No past surgical history on file.    MENTAL STATUS EXAM   General Appearance:  Cleanly groomed and dressed and well developed  Eye Contact:  Good eye contact  Attitude:  Cooperative, polite and candid  Motor Activity:  Normal gait, posture  Muscle Strength:  Normal  Speech:  Normal rate, tone, volume  Language:  Spontaneous  Mood and affect:  Normal, pleasant  Thought Process:  Logical and goal-directed  Associations/ Thought Content:  No delusions  Hallucinations:  None  Suicidal Ideations:  Not present  Homicidal Ideation:  Not present  Sensorium:  Alert and clear  Orientation:  Person, place, time and  situation  Immediate Recall, Recent, and Remote Memory:  Intact  Attention Span/ Concentration:  Good  Fund of Knowledge:  Appropriate for age and educational level  Intellectual Functioning:  Average range  Insight:  Good  Judgement:  Good  Reliability:  Good  Impulse Control:  Good      Vital Signs:   There were no vitals taken for this visit.   Lab Results:   No visits with results within 6 Month(s) from this visit.   Latest known visit with results is:   Lab on 01/17/2024   Component Date Value Ref Range Status    Amphet/Methamphet, Screen 01/17/2024 Negative  Negative Final    Barbiturates Screen, Urine 01/17/2024 Negative  Negative Final    Benzodiazepine Screen, Urine 01/17/2024 Negative  Negative Final    Cocaine Screen, Urine 01/17/2024 Negative  Negative Final    Opiate Screen 01/17/2024 Negative  Negative Final    THC, Screen, Urine 01/17/2024 Negative  Negative Final    Methadone Screen, Urine 01/17/2024 Negative  Negative Final    Oxycodone Screen, Urine 01/17/2024 Negative  Negative Final    Fentanyl, Urine 01/17/2024 Negative  Negative Final       ASSESSMENT AND PLAN:    ICD-10-CM ICD-9-CM   1. Attention deficit hyperactivity disorder (ADHD), combined type  F90.2 314.01       Abhijit is a 19 y.o. male who presents today for follow-up regarding brief visit for evaluation of medication effects. We have discussed the interval history and the treatment plan below, including potential R/B/SE of the recommended regimen of which the patient demonstrates understanding. Patient is agreeable to call 911 or go to the nearest ER should he become concerned for his own safety and/or the safety of those around him. There are are no overt indices of acute madison/psychosis on exam today. JOBY reviewed and is as expected.    Medication regimen: continue Adderall XR 15 mg po q am; patient is advised not to misuse prescribed medications or to use them with any exogenous substances that aren't disclosed to this provider  as they may interact with the regimen to the patient's detriment.   Risk Assessment: protracted risk is low, imminent risk is low. Do note that this is subject to change with the Alevism of new stressors, treatment non-adherence, use of substances, and/or new medical ails.   Monitoring: reviewed labs/imaging as populated above; ordered  Therapy: deferred  Follow-up: 3 months   Communications: N/A    TREATMENT PLAN/GOALS: challenge patterns of living conducive to symptom burden, implement recommended regimen as above with augmentative, intermittent supportive psychotherapy to reduce symptom burden. Patient acknowledged and verbally consented to continue treatment. The importance of adherence to the recommended treatment and interval follow-up appointments was again emphasized today: patient has good treatment adherence per given history. Patient was today reminded to limit daily caffeine intake, hydrate appropriately, eat healthy and nutritious foods, engage sleep hygiene measures, engage appropriate exposure to sunlight, engage with hobbies in balance with life necessities, and exercise appropriate to their capacity to do so.       Parts of this note are electronic transcriptions/translations of spoken language to printed text using the Dragon Dictation system.    Electronically signed by LAURA Duenas, 06/10/25

## 2025-06-12 ENCOUNTER — TELEMEDICINE (OUTPATIENT)
Dept: PSYCHIATRY | Facility: CLINIC | Age: 20
End: 2025-06-12
Payer: OTHER GOVERNMENT

## 2025-06-12 DIAGNOSIS — F90.2 ATTENTION DEFICIT HYPERACTIVITY DISORDER (ADHD), COMBINED TYPE: Primary | ICD-10-CM

## 2025-06-12 RX ORDER — DEXTROAMPHETAMINE SACCHARATE, AMPHETAMINE ASPARTATE MONOHYDRATE, DEXTROAMPHETAMINE SULFATE AND AMPHETAMINE SULFATE 3.75; 3.75; 3.75; 3.75 MG/1; MG/1; MG/1; MG/1
15 CAPSULE, EXTENDED RELEASE ORAL DAILY
Qty: 30 CAPSULE | Refills: 0 | Status: SHIPPED | OUTPATIENT
Start: 2025-06-19 | End: 2025-07-19

## 2025-06-23 DIAGNOSIS — F90.2 ATTENTION DEFICIT HYPERACTIVITY DISORDER (ADHD), COMBINED TYPE: ICD-10-CM

## 2025-06-23 NOTE — TELEPHONE ENCOUNTER
CONTROLLED MEDICATION REFILL REQUEST    STATE REGULATION APPT EVERY 3 MONTHS     UDS(URINE DRUG SCREEN) EVERY 6 MONTHS OR UP TO PROVIDER PREFERENCE   (DE PAKO & DICK 1 PER YEAR)     NEW NARC CONSENT EVERY YEAR      MEDICATION: amphetamine-dextroamphetamine XR (Adderall XR) 15 MG 24 hr capsule (06/19/2025)     TOO SOON FOR REFILL      NEXT OFFICE VISIT: NONE IN EPIC     LAST OFFICE VISIT: Telemedicine with Flower Robertson APRN (06/12/2025)     NARC CONSENT: CONTROLLED SUBSTANCE AGREEMENT - SCAN - CONTROLLED SUBSTANCE AGREEMENT, [facility], [phone] (01/17/2024)     URINE DRUG SCREEN(STANDING ORDER)   (DE PAKO & DICK 1 PER YEAR): Urine Drug Screen - Urine, Clean Catch (01/17/2024 16:51)

## 2025-06-23 NOTE — TELEPHONE ENCOUNTER
ATTEMPTED TO CONTACT PT(PATIENT)      UNABLE TO LEAVE A VOICEMAIL WITH INSTRUCTIONS TO RETURN CALL TO OFFICE AT [phone]

## 2025-06-24 RX ORDER — DEXTROAMPHETAMINE SACCHARATE, AMPHETAMINE ASPARTATE MONOHYDRATE, DEXTROAMPHETAMINE SULFATE AND AMPHETAMINE SULFATE 3.75; 3.75; 3.75; 3.75 MG/1; MG/1; MG/1; MG/1
15 CAPSULE, EXTENDED RELEASE ORAL DAILY
Qty: 30 CAPSULE | Refills: 0 | OUTPATIENT
Start: 2025-06-24 | End: 2025-07-24

## 2025-06-24 NOTE — TELEPHONE ENCOUNTER
ATTEMPTED TO CONTACT PT(PATIENT)      UNABLE TO LEAVE A VOICEMAIL WITH INSTRUCTIONS TO RETURN CALL TO OFFICE AT [phone]

## 2025-06-25 DIAGNOSIS — F90.2 ATTENTION DEFICIT HYPERACTIVITY DISORDER (ADHD), COMBINED TYPE: ICD-10-CM

## 2025-06-26 RX ORDER — DEXTROAMPHETAMINE SACCHARATE, AMPHETAMINE ASPARTATE MONOHYDRATE, DEXTROAMPHETAMINE SULFATE AND AMPHETAMINE SULFATE 3.75; 3.75; 3.75; 3.75 MG/1; MG/1; MG/1; MG/1
15 CAPSULE, EXTENDED RELEASE ORAL DAILY
Qty: 30 CAPSULE | Refills: 0 | OUTPATIENT
Start: 2025-06-26 | End: 2025-07-26

## 2025-06-26 NOTE — TELEPHONE ENCOUNTER
Called pt and told him to CALL THE PHARMACY TO ASK FOR THEM TO PREPARE THE PRESCRIPTION ON HOLD IN HIS FILE.

## 2025-07-27 DIAGNOSIS — F90.2 ATTENTION DEFICIT HYPERACTIVITY DISORDER (ADHD), COMBINED TYPE: ICD-10-CM

## 2025-07-28 NOTE — TELEPHONE ENCOUNTER
CONTROLLED MEDICATION REFILL REQUEST    STATE REGULATION APPT EVERY 3 MONTHS     UDS(URINE DRUG SCREEN) EVERY 6 MONTHS OR UP TO PROVIDER PREFERENCE   (DE PAKO & DICK 1 PER YEAR)     NEW NARC CONSENT EVERY YEAR      MEDICATION: amphetamine-dextroamphetamine XR (Adderall XR) 15 MG 24 hr capsule (06/19/2025)      NEXT OFFICE VISIT: NONE IN EPIC     LAST OFFICE VISIT: Telemedicine with Flower Robertson APRN (06/12/2025)     NARC CONSENT: CONTROLLED SUBSTANCE AGREEMENT - SCAN - CONTROLLED SUBSTANCE AGREEMENT, [facility], [phone] (01/17/2024)     URINE DRUG SCREEN(STANDING ORDER)   (DE PAKO & DICK 1 PER YEAR): UDS(URINE DRUG SCREEN) PENDED FOR PROVIDE REVIEW

## 2025-07-28 NOTE — TELEPHONE ENCOUNTER
ATTEMPTED TO CONTACT PT(PATIENT)      UNABLE TO LEAVE A VOICEMAIL WITH INSTRUCTIONS TO RETURN CALL TO OFFICE AT [phone]

## 2025-07-29 RX ORDER — DEXTROAMPHETAMINE SACCHARATE, AMPHETAMINE ASPARTATE MONOHYDRATE, DEXTROAMPHETAMINE SULFATE AND AMPHETAMINE SULFATE 3.75; 3.75; 3.75; 3.75 MG/1; MG/1; MG/1; MG/1
15 CAPSULE, EXTENDED RELEASE ORAL DAILY
Qty: 30 CAPSULE | Refills: 0 | Status: SHIPPED | OUTPATIENT
Start: 2025-07-29 | End: 2025-08-28

## 2025-07-29 NOTE — TELEPHONE ENCOUNTER
I ATTEMPTED TO CONTACT PT VIA TELEPHONE TO SCHEDULE FOLLOW UP.  2ND ATTEMPT.     NO ANSWER.    UNABLE TO LEAVE VOICEMAIL.

## 2025-07-31 NOTE — TELEPHONE ENCOUNTER
ATTEMPTED TO CONTACT PT(PATIENT)      UNABLE TO LEAVE A VOICEMAIL WITH INSTRUCTIONS TO RETURN CALL TO OFFICE AT [phone]

## 2025-08-23 DIAGNOSIS — F90.2 ATTENTION DEFICIT HYPERACTIVITY DISORDER (ADHD), COMBINED TYPE: ICD-10-CM

## 2025-08-25 RX ORDER — DEXTROAMPHETAMINE SACCHARATE, AMPHETAMINE ASPARTATE MONOHYDRATE, DEXTROAMPHETAMINE SULFATE AND AMPHETAMINE SULFATE 3.75; 3.75; 3.75; 3.75 MG/1; MG/1; MG/1; MG/1
15 CAPSULE, EXTENDED RELEASE ORAL DAILY
Qty: 30 CAPSULE | Refills: 0 | Status: SHIPPED | OUTPATIENT
Start: 2025-08-28 | End: 2025-09-27